# Patient Record
Sex: MALE | Race: WHITE | NOT HISPANIC OR LATINO | Employment: OTHER | URBAN - METROPOLITAN AREA
[De-identification: names, ages, dates, MRNs, and addresses within clinical notes are randomized per-mention and may not be internally consistent; named-entity substitution may affect disease eponyms.]

---

## 2023-09-01 LAB
ALANINE AMINOTRANSFERASE (SGPT) (U/L) IN SER/PLAS: 13 U/L (ref 10–52)
ALBUMIN (G/DL) IN SER/PLAS: 4.4 G/DL (ref 3.4–5)
ALKALINE PHOSPHATASE (U/L) IN SER/PLAS: 67 U/L (ref 33–136)
ANION GAP IN SER/PLAS: 9 MMOL/L (ref 10–20)
ASPARTATE AMINOTRANSFERASE (SGOT) (U/L) IN SER/PLAS: 18 U/L (ref 9–39)
BILIRUBIN TOTAL (MG/DL) IN SER/PLAS: 0.9 MG/DL (ref 0–1.2)
CALCIUM (MG/DL) IN SER/PLAS: 9.4 MG/DL (ref 8.6–10.3)
CARBON DIOXIDE, TOTAL (MMOL/L) IN SER/PLAS: 29 MMOL/L (ref 21–32)
CHLORIDE (MMOL/L) IN SER/PLAS: 101 MMOL/L (ref 98–107)
CHOLESTEROL (MG/DL) IN SER/PLAS: 117 MG/DL (ref 0–199)
CHOLESTEROL IN HDL (MG/DL) IN SER/PLAS: 48.1 MG/DL
CHOLESTEROL/HDL RATIO: 2.4
CREATININE (MG/DL) IN SER/PLAS: 0.97 MG/DL (ref 0.5–1.3)
ERYTHROCYTE DISTRIBUTION WIDTH (RATIO) BY AUTOMATED COUNT: 13 % (ref 11.5–14.5)
ERYTHROCYTE MEAN CORPUSCULAR HEMOGLOBIN CONCENTRATION (G/DL) BY AUTOMATED: 33.5 G/DL (ref 32–36)
ERYTHROCYTE MEAN CORPUSCULAR VOLUME (FL) BY AUTOMATED COUNT: 93 FL (ref 80–100)
ERYTHROCYTES (10*6/UL) IN BLOOD BY AUTOMATED COUNT: 4.64 X10E12/L (ref 4.5–5.9)
GFR MALE: 84 ML/MIN/1.73M2
GLUCOSE (MG/DL) IN SER/PLAS: 96 MG/DL (ref 74–99)
HEMATOCRIT (%) IN BLOOD BY AUTOMATED COUNT: 43.3 % (ref 41–52)
HEMOGLOBIN (G/DL) IN BLOOD: 14.5 G/DL (ref 13.5–17.5)
LDL: 55 MG/DL (ref 0–99)
LEUKOCYTES (10*3/UL) IN BLOOD BY AUTOMATED COUNT: 6.3 X10E9/L (ref 4.4–11.3)
PLATELETS (10*3/UL) IN BLOOD AUTOMATED COUNT: 214 X10E9/L (ref 150–450)
POTASSIUM (MMOL/L) IN SER/PLAS: 4.1 MMOL/L (ref 3.5–5.3)
PROSTATE SPECIFIC AG (NG/ML) IN SER/PLAS: 1.23 NG/ML (ref 0–4)
PROTEIN TOTAL: 6.6 G/DL (ref 6.4–8.2)
SODIUM (MMOL/L) IN SER/PLAS: 135 MMOL/L (ref 136–145)
TRIGLYCERIDE (MG/DL) IN SER/PLAS: 69 MG/DL (ref 0–149)
UREA NITROGEN (MG/DL) IN SER/PLAS: 14 MG/DL (ref 6–23)
VLDL: 14 MG/DL (ref 0–40)

## 2023-10-26 ENCOUNTER — TELEMEDICINE (OUTPATIENT)
Dept: PRIMARY CARE | Facility: CLINIC | Age: 68
End: 2023-10-26
Payer: MEDICARE

## 2023-10-26 DIAGNOSIS — J01.90 ACUTE SINUSITIS, RECURRENCE NOT SPECIFIED, UNSPECIFIED LOCATION: Primary | ICD-10-CM

## 2023-10-26 PROCEDURE — 99212 OFFICE O/P EST SF 10 MIN: CPT | Performed by: INTERNAL MEDICINE

## 2023-10-26 RX ORDER — POTASSIUM CHLORIDE 750 MG/1
10 TABLET, EXTENDED RELEASE ORAL DAILY
COMMUNITY
Start: 2021-08-17 | End: 2024-02-13 | Stop reason: SDUPTHER

## 2023-10-26 RX ORDER — LOSARTAN POTASSIUM 100 MG/1
100 TABLET ORAL DAILY
COMMUNITY
End: 2024-03-07 | Stop reason: SDUPTHER

## 2023-10-26 RX ORDER — SIMVASTATIN 20 MG/1
20 TABLET, FILM COATED ORAL NIGHTLY
COMMUNITY
Start: 2021-08-17 | End: 2024-03-07 | Stop reason: SDUPTHER

## 2023-10-26 RX ORDER — ASPIRIN 81 MG/1
1 TABLET ORAL DAILY
COMMUNITY

## 2023-10-26 RX ORDER — AMLODIPINE BESYLATE 5 MG/1
1 TABLET ORAL DAILY
COMMUNITY
Start: 2021-08-17 | End: 2024-03-07 | Stop reason: SDUPTHER

## 2023-10-26 RX ORDER — AMOXICILLIN AND CLAVULANATE POTASSIUM 875; 125 MG/1; MG/1
875 TABLET, FILM COATED ORAL 2 TIMES DAILY
Qty: 20 TABLET | Refills: 0 | Status: SHIPPED | OUTPATIENT
Start: 2023-10-26 | End: 2023-11-05

## 2023-10-26 RX ORDER — ATENOLOL 50 MG/1
1.5 TABLET ORAL DAILY
COMMUNITY
Start: 2021-08-17 | End: 2024-03-07 | Stop reason: SDUPTHER

## 2023-10-26 RX ORDER — SODIUM FLUORIDE/POTASSIUM NIT 1.1 %-5 %
PASTE (ML) DENTAL
COMMUNITY

## 2023-10-26 ASSESSMENT — PATIENT HEALTH QUESTIONNAIRE - PHQ9
2. FEELING DOWN, DEPRESSED OR HOPELESS: NOT AT ALL
SUM OF ALL RESPONSES TO PHQ9 QUESTIONS 1 AND 2: 0
1. LITTLE INTEREST OR PLEASURE IN DOING THINGS: NOT AT ALL

## 2023-10-26 NOTE — PROGRESS NOTES
Subjective   Patient ID: Rodolfo Wellington is a 68 y.o. male who presents for Cough (Sx's include coughing, wheezing, Yellow nasal discharge that started 1 week ago.  He did have a fever the 1st few days.).    HPI Pt is a pleasant 68 y.o. M who was evaluated during the phone call visit. PT states that he feels sick a little over a week and initially had fever which went away. However, pt states that the sinus discharge turned yellow and he has a lot of post nasal drip which triggers a lot of cough. During the clinical encounter pt denies fever, chills, no SOB, no chest pain/tightness, no abdominal pain, no N/V/D reported, no change of urination reported. Pt denies any other health concerns during this visit.  Sohx: reviewed  PMHx and Fhx: reviewed      Review of Systems  Constitutional: no fever, no chills  Eyes: no eyesight problems, no eye redness, no eye pain, no blurred vision  ENT: as mentioned at HPI  Cardiovascular: no chest pain or tightness, no SOB, the heart rate was not fast or slow  Respiratory:  no dyspnea with exertion or with rest, but as mentioned at HPI  Gastrointestinal: no abdominal pain, no constipation or diarrhea, no heartburn, no nausea or vomiting  Genitourinary: no urine frequency, no dysuria, no urinary urgency, no urinary incontinence or retention  Musculoskeletal: no back pain, no arthralgia, no joint swelling or stiffness, no muscle weakness  Integumentary: no skin lesions or rash  Neurological: no headaches, no dizziness or fainting, no limb weakness  Psychiatric: no mood changes, no anxiety or depression, no suicidal thoughts  Endocrine: no weight change, no temperature intolerance, no change of appetite  Hematologic/Lymphatic: no easy bruising or bleeding    Objective   There were no vitals taken for this visit.    Physical Exam  PE was not performed due to the phone setting of this visit, but pt was able to speak in full sentences, alert, oriented, not confused.  Vitals: were not  provided    Assessment/Plan   Acute sinusitis  Hx as mentioned above  Will start on 10 day treatment course with Amox/Clav acid 875/125 mg BID  Pt was instructed to contact PCP if pt will have no improvement of the condition/worsening of the sxs/new sxs on the current treatment  Plan was d/c with the pt in details, verbalized understanding, agreed  Please follow up with PCP as planned or sooner if needed

## 2023-11-22 ENCOUNTER — OFFICE VISIT (OUTPATIENT)
Dept: PRIMARY CARE | Facility: CLINIC | Age: 68
End: 2023-11-22
Payer: MEDICARE

## 2023-11-22 VITALS
HEIGHT: 71 IN | SYSTOLIC BLOOD PRESSURE: 130 MMHG | HEART RATE: 74 BPM | BODY MASS INDEX: 27.72 KG/M2 | WEIGHT: 198 LBS | TEMPERATURE: 98.2 F | DIASTOLIC BLOOD PRESSURE: 72 MMHG

## 2023-11-22 DIAGNOSIS — Z23 NEED FOR TDAP VACCINATION: ICD-10-CM

## 2023-11-22 DIAGNOSIS — L03.012 CELLULITIS OF LEFT INDEX FINGER: Primary | ICD-10-CM

## 2023-11-22 DIAGNOSIS — Z78.9 NON-SMOKER: ICD-10-CM

## 2023-11-22 DIAGNOSIS — S61.239A PUNCTURE WOUND OF FINGER, INITIAL ENCOUNTER: ICD-10-CM

## 2023-11-22 PROCEDURE — 90715 TDAP VACCINE 7 YRS/> IM: CPT | Performed by: FAMILY MEDICINE

## 2023-11-22 PROCEDURE — 1159F MED LIST DOCD IN RCRD: CPT | Performed by: FAMILY MEDICINE

## 2023-11-22 PROCEDURE — 3008F BODY MASS INDEX DOCD: CPT | Performed by: FAMILY MEDICINE

## 2023-11-22 PROCEDURE — 90471 IMMUNIZATION ADMIN: CPT | Performed by: FAMILY MEDICINE

## 2023-11-22 PROCEDURE — 99214 OFFICE O/P EST MOD 30 MIN: CPT | Performed by: FAMILY MEDICINE

## 2023-11-22 PROCEDURE — 1036F TOBACCO NON-USER: CPT | Performed by: FAMILY MEDICINE

## 2023-11-22 RX ORDER — CEPHALEXIN 500 MG/1
500 CAPSULE ORAL ONCE
Status: CANCELLED | OUTPATIENT
Start: 2023-11-22 | End: 2023-11-22

## 2023-11-22 RX ORDER — CEPHALEXIN 500 MG/1
500 CAPSULE ORAL 3 TIMES DAILY
Qty: 20 CAPSULE | Refills: 0 | Status: SHIPPED | OUTPATIENT
Start: 2023-11-22 | End: 2023-12-07 | Stop reason: WASHOUT

## 2023-11-22 ASSESSMENT — ENCOUNTER SYMPTOMS
HEMATOLOGIC/LYMPHATIC NEGATIVE: 1
CARDIOVASCULAR NEGATIVE: 1
ENDOCRINE NEGATIVE: 1
RESPIRATORY NEGATIVE: 1
ALLERGIC/IMMUNOLOGIC NEGATIVE: 1
EYES NEGATIVE: 1
CONSTITUTIONAL NEGATIVE: 1
GASTROINTESTINAL NEGATIVE: 1
MUSCULOSKELETAL NEGATIVE: 1
PSYCHIATRIC NEGATIVE: 1

## 2023-11-22 ASSESSMENT — PATIENT HEALTH QUESTIONNAIRE - PHQ9
1. LITTLE INTEREST OR PLEASURE IN DOING THINGS: NOT AT ALL
2. FEELING DOWN, DEPRESSED OR HOPELESS: NOT AT ALL
SUM OF ALL RESPONSES TO PHQ9 QUESTIONS 1 AND 2: 0

## 2023-11-22 NOTE — PROGRESS NOTES
Valente Reynolds is here for evaluation of a possible foreign object in his left index finger.  About 5 or 6 days ago he was working with some rosebushes in his yard.  He did not feel any pricking sensation but later noticed a hole in his skin which subsequently became red and raised.  Since then he has been squeezing the lesion and trying to express any fluid out of it.  He had gotten some small amounts of pustular drainage several days ago.  He continues to irritate the lesion by squeezing it.  He has had no fevers or chills.  He is not sure if any remnant of a thorn might be remaining in his finger.  He would like to have this area explored to make sure there is no further foreign object in the wound.  He does not remember the date of his last tetanus immunization    Patient ID: Rodolfo Wellington is a 68 y.o. male who presents for Follow-up (Left index finger infection x6 days; thinks maybe a thorn):    Problem List Items Addressed This Visit    None  Visit Diagnoses       Non-smoker        BMI 27.0-27.9,adult        Need for Tdap vaccination               Past Medical History:   Diagnosis Date    Body mass index (BMI) 26.0-26.9, adult     BMI 26.0-26.9,adult    COVID-19 09/02/2022    COVID-19    Other specified counseling 08/16/2022    Counseled about COVID-19 virus infection    Overweight     Overweight      Past Surgical History:   Procedure Laterality Date    OTHER SURGICAL HISTORY  01/26/2022    Throat surgery    OTHER SURGICAL HISTORY  01/26/2022    Finger surgical procedure    OTHER SURGICAL HISTORY  02/15/2022    Colonoscopy      No family history on file.   Social History     Socioeconomic History    Marital status:      Spouse name: Not on file    Number of children: Not on file    Years of education: Not on file    Highest education level: Not on file   Occupational History    Not on file   Tobacco Use    Smoking status: Never    Smokeless tobacco: Never   Substance and Sexual Activity    Alcohol use:  Yes     Alcohol/week: 4.0 standard drinks of alcohol     Types: 4 Standard drinks or equivalent per week    Drug use: Not Currently    Sexual activity: Not on file   Other Topics Concern    Not on file   Social History Narrative    Not on file     Social Determinants of Health     Financial Resource Strain: Not on file   Food Insecurity: Not on file   Transportation Needs: Not on file   Physical Activity: Not on file   Stress: Not on file   Social Connections: Not on file   Intimate Partner Violence: Not on file   Housing Stability: Not on file      Oxycodone-acetaminophen   Current Outpatient Medications   Medication Sig Dispense Refill    amLODIPine (Norvasc) 5 mg tablet Take 1 tablet (5 mg) by mouth once daily.      aspirin 81 mg EC tablet Take 1 tablet (81 mg) by mouth once daily.      atenolol (Tenormin) 50 mg tablet Take 1.5 tablets (75 mg) by mouth once daily.      Klor-Con M10 10 mEq ER tablet Take 1 tablet (10 mEq) by mouth once daily.      losartan (Cozaar) 100 mg tablet Take 1 tablet (100 mg) by mouth once daily.      PreviDent 5000 Sensitive 1.1-5 % paste USE ONCE DAILY AT BEDTIME      simvastatin (Zocor) 20 mg tablet Take 1 tablet (20 mg) by mouth once daily at bedtime.       No current facility-administered medications for this visit.       Immunization History   Administered Date(s) Administered    Flu vaccine (IIV4), preservative free *Check age/dose* 11/01/2018    Flu vaccine, quadrivalent, high-dose, preservative free, age 65y+ (FLUZONE) 09/25/2020    Influenza, High Dose Seasonal, Preservative Free 09/29/2018    Influenza, Seasonal, Quadrivalent, Adjuvanted 10/29/2021, 10/05/2022, 10/02/2023    Influenza, Unspecified 11/01/2012, 11/01/2014, 11/01/2015, 12/05/2016, 10/01/2021    Influenza, injectable, MDCK, preservative free, quadrivalent 12/04/2017    Influenza, seasonal, injectable 10/01/2020    Influenza, seasonal, injectable, preservative free 11/03/2015    Novel influenza-H1N1-09,  preservative-free 01/04/2010    Pfizer COVID-19 vaccine, Fall 2023, 12 years and older, (30mcg/0.3mL) 11/16/2023    Pfizer COVID-19 vaccine, bivalent, age 12 years and older (30 mcg/0.3 mL) 10/20/2022    Pfizer Gray Cap SARS-CoV-2 04/21/2022    Pfizer Purple Cap SARS-CoV-2 03/11/2021, 04/08/2021, 10/21/2021, 04/22/2022    Pneumococcal conjugate vaccine, 13-valent (PREVNAR 13) 07/03/2018    Pneumococcal polysaccharide vaccine, 23-valent, age 2 years and older (PNEUMOVAX 23) 07/12/2019    Zoster vaccine, recombinant, adult (SHINGRIX) 08/29/2021, 12/03/2021    Zoster, live 02/05/2016        Review of Systems   Constitutional: Negative.    HENT: Negative.     Eyes: Negative.    Respiratory: Negative.     Cardiovascular: Negative.    Gastrointestinal: Negative.    Endocrine: Negative.    Genitourinary: Negative.    Musculoskeletal: Negative.    Skin: Negative.    Allergic/Immunologic: Negative.    Hematological: Negative.    Psychiatric/Behavioral: Negative.     All other systems reviewed and are negative.       Vitals:    11/22/23 1109   BP: 130/72   Pulse: 74   Temp: 36.8 °C (98.2 °F)     Vitals:    11/22/23 1109   Weight: 89.8 kg (198 lb)      Physical Exam  Constitutional:       General: He is not in acute distress.     Appearance: Normal appearance.   Neurological:      Mental Status: He is alert.     Left index finger--there is a reddened raised area of skin on the tip of the finger dorso medially.  There appears to be a small dark area in the middle of the puncture wound.  With the needle I was able to remove what appeared to be a thorn like structure.  There is some serous drainage able to be expressed from the lesion.  I did not do any deep probing of the wound itself.  There is no pustular drainage or bleeding.    ASSESSMENT/PLAN: Puncture wound left index finger with mild cellulitis and questionable foreign object    Plan--begin Keflex 500 mg 3 times daily for 1 week.  Give Tdap immunization update  Advised  patient not to squeeze the lesion anymore that he did vigorously several times while in the office.  Recommended that if any redness or swelling persist that he needs to consult a hand surgeon Dr. Cordon for further surgical evaluation.  Call for any increased redness pain or swelling  Follow-up as scheduled and call as needed

## 2023-12-07 ENCOUNTER — TELEMEDICINE (OUTPATIENT)
Dept: PRIMARY CARE | Facility: CLINIC | Age: 68
End: 2023-12-07
Payer: MEDICARE

## 2023-12-07 DIAGNOSIS — J01.90 ACUTE SINUSITIS, RECURRENCE NOT SPECIFIED, UNSPECIFIED LOCATION: Primary | ICD-10-CM

## 2023-12-07 PROCEDURE — 99441 PR PHYS/QHP TELEPHONE EVALUATION 5-10 MIN: CPT | Performed by: INTERNAL MEDICINE

## 2023-12-07 RX ORDER — AZITHROMYCIN 250 MG/1
TABLET, FILM COATED ORAL
Qty: 6 TABLET | Refills: 0 | Status: SHIPPED | OUTPATIENT
Start: 2023-12-07 | End: 2023-12-12

## 2023-12-07 NOTE — PROGRESS NOTES
Subjective   Patient ID: Rodolfo Wellington is a 68 y.o. male who presents for Nasal Congestion (Sick visit, congestion).    HPI Pt is a pleasant 68 y.o. M who was evaluated during the phone call visit with the hx of congested nose, colored nasal discharge, post nasal drip which triggers the cough. Pt states that he has the sxs for more than a week. Pt also states that he had a sore throat, but it went away. During the clinical encounter pt denies fever, chills, no SOB, no chest pain/tightness, no abdominal pain, no N/V/D reported, no change of urination reported. Pt denies any other health concerns during this visit.  Sohx: reviewed  PMHx and Fhx: reviewed    Review of Systems  Constitutional: no fever, no chills  Eyes: no eyesight problems, no eye redness, no eye pain, no blurred vision  ENT: no ear pain, no hearing loss, but as mentioned at HPI  Cardiovascular: no chest pain or tightness, no SOB, the heart rate was not fast or slow  Respiratory: no dyspnea with exertion or with rest, no wheezing, but as mentioned at HPI  Gastrointestinal: no abdominal pain, no constipation or diarrhea, no heartburn, no nausea or vomiting  Genitourinary: no urine frequency, no dysuria, no urinary urgency, no urinary incontinence or retention  Musculoskeletal: no back pain, no arthralgia, no joint swelling or stiffness, no muscle weakness  Integumentary: no skin lesions or rash  Neurological: no headaches, no dizziness or fainting, no limb weakness  Psychiatric: no mood changes, no anxiety or depression, no suicidal thoughts  Endocrine: no weight change, no temperature intolerance, no change of appetite  Hematologic/Lymphatic: no easy bruising or bleeding  Objective   There were no vitals taken for this visit.    Physical Exam  PE was not performed due to the phone setting of this visit, but pt was able to speak in full sentences, alert, oriented, not confused.  Vitals: were not provided    Assessment/Plan   Acute sinusitis;  Hx as  mentioned above  Will start on 5 day TX with Azithromycin 250 mg PO daily  Pt was instructed to use tylenol for pain management  Pt was instructed to contact PCP if pt will have no improvement of the condition/worsening of the sxs/new sxs on the current treatment  Plan was d/c with the pt in details, verbalized understanding, agreed  Please follow up with PCP as planned or sooner if needed

## 2023-12-14 ENCOUNTER — HOSPITAL ENCOUNTER (OUTPATIENT)
Facility: HOSPITAL | Age: 68
Setting detail: OUTPATIENT SURGERY
End: 2023-12-14
Payer: MEDICARE

## 2023-12-14 DIAGNOSIS — M71.342 OTHER BURSAL CYST, LEFT HAND: Primary | ICD-10-CM

## 2024-02-08 DIAGNOSIS — I10 ESSENTIAL (PRIMARY) HYPERTENSION: ICD-10-CM

## 2024-02-09 NOTE — TELEPHONE ENCOUNTER
Patient says he will run out of this medication on Tues 2/13 and that he would like a 30 day supply if possible instead of 90. He is scheduled to see Dr. Gibbs on 3/7/24 and wants to get his 90 day refill ordered during that appt. Please advise

## 2024-02-12 NOTE — TELEPHONE ENCOUNTER
Pt called again wondering about klor-con will need it soon. Pt will be out by 2/13

## 2024-02-13 DIAGNOSIS — I10 ESSENTIAL HYPERTENSION: Primary | ICD-10-CM

## 2024-02-13 RX ORDER — POTASSIUM CHLORIDE 750 MG/1
10 TABLET, EXTENDED RELEASE ORAL DAILY
Qty: 90 TABLET | Refills: 1 | Status: SHIPPED | OUTPATIENT
Start: 2024-02-13 | End: 2024-03-07 | Stop reason: SDUPTHER

## 2024-02-15 RX ORDER — POTASSIUM CHLORIDE 750 MG/1
10 TABLET, EXTENDED RELEASE ORAL DAILY
Qty: 90 TABLET | Refills: 0 | OUTPATIENT
Start: 2024-02-15

## 2024-03-07 ENCOUNTER — OFFICE VISIT (OUTPATIENT)
Dept: PRIMARY CARE | Facility: CLINIC | Age: 69
End: 2024-03-07
Payer: MEDICARE

## 2024-03-07 ENCOUNTER — TELEPHONE (OUTPATIENT)
Dept: PRIMARY CARE | Facility: CLINIC | Age: 69
End: 2024-03-07

## 2024-03-07 VITALS
BODY MASS INDEX: 27.58 KG/M2 | HEART RATE: 88 BPM | DIASTOLIC BLOOD PRESSURE: 54 MMHG | HEIGHT: 71 IN | SYSTOLIC BLOOD PRESSURE: 154 MMHG | WEIGHT: 197 LBS | TEMPERATURE: 97.3 F

## 2024-03-07 DIAGNOSIS — I10 PRIMARY HYPERTENSION: ICD-10-CM

## 2024-03-07 DIAGNOSIS — Z78.9 NEVER SMOKED CIGARETTES: ICD-10-CM

## 2024-03-07 DIAGNOSIS — Z12.5 PROSTATE CANCER SCREENING: ICD-10-CM

## 2024-03-07 DIAGNOSIS — E66.3 OVERWEIGHT WITH BODY MASS INDEX (BMI) OF 27 TO 27.9 IN ADULT: ICD-10-CM

## 2024-03-07 DIAGNOSIS — E78.5 HYPERLIPIDEMIA, UNSPECIFIED HYPERLIPIDEMIA TYPE: ICD-10-CM

## 2024-03-07 DIAGNOSIS — E05.90 HYPERTHYROIDISM: ICD-10-CM

## 2024-03-07 PROBLEM — I51.7 LVH (LEFT VENTRICULAR HYPERTROPHY): Status: ACTIVE | Noted: 2024-03-07

## 2024-03-07 PROBLEM — I77.810 DILATED AORTIC ROOT (CMS-HCC): Status: ACTIVE | Noted: 2024-03-07

## 2024-03-07 PROBLEM — N28.9 RENAL INSUFFICIENCY: Status: ACTIVE | Noted: 2024-03-07

## 2024-03-07 PROCEDURE — 1160F RVW MEDS BY RX/DR IN RCRD: CPT | Performed by: FAMILY MEDICINE

## 2024-03-07 PROCEDURE — 3078F DIAST BP <80 MM HG: CPT | Performed by: FAMILY MEDICINE

## 2024-03-07 PROCEDURE — 3008F BODY MASS INDEX DOCD: CPT | Performed by: FAMILY MEDICINE

## 2024-03-07 PROCEDURE — 1159F MED LIST DOCD IN RCRD: CPT | Performed by: FAMILY MEDICINE

## 2024-03-07 PROCEDURE — 99213 OFFICE O/P EST LOW 20 MIN: CPT | Performed by: FAMILY MEDICINE

## 2024-03-07 PROCEDURE — 1036F TOBACCO NON-USER: CPT | Performed by: FAMILY MEDICINE

## 2024-03-07 PROCEDURE — 3077F SYST BP >= 140 MM HG: CPT | Performed by: FAMILY MEDICINE

## 2024-03-07 RX ORDER — AMLODIPINE BESYLATE 5 MG/1
5 TABLET ORAL 2 TIMES DAILY
Qty: 180 TABLET | Refills: 1 | Status: SHIPPED | OUTPATIENT
Start: 2024-03-07

## 2024-03-07 RX ORDER — LOSARTAN POTASSIUM 100 MG/1
100 TABLET ORAL DAILY
Qty: 90 TABLET | Refills: 1 | Status: SHIPPED | OUTPATIENT
Start: 2024-03-07

## 2024-03-07 RX ORDER — POTASSIUM CHLORIDE 750 MG/1
10 TABLET, EXTENDED RELEASE ORAL DAILY
Qty: 90 TABLET | Refills: 1 | Status: SHIPPED | OUTPATIENT
Start: 2024-03-07

## 2024-03-07 RX ORDER — ATENOLOL 50 MG/1
75 TABLET ORAL DAILY
Qty: 135 TABLET | Refills: 1 | Status: SHIPPED | OUTPATIENT
Start: 2024-03-07

## 2024-03-07 RX ORDER — SIMVASTATIN 20 MG/1
20 TABLET, FILM COATED ORAL NIGHTLY
Qty: 90 TABLET | Refills: 1 | Status: SHIPPED | OUTPATIENT
Start: 2024-03-07

## 2024-03-07 ASSESSMENT — PATIENT HEALTH QUESTIONNAIRE - PHQ9
SUM OF ALL RESPONSES TO PHQ9 QUESTIONS 1 AND 2: 0
2. FEELING DOWN, DEPRESSED OR HOPELESS: NOT AT ALL
1. LITTLE INTEREST OR PLEASURE IN DOING THINGS: NOT AT ALL

## 2024-03-07 NOTE — PROGRESS NOTES
Valente Reynolds is here for follow-up on hypertension and hyperlipidemia.  He states that he is overall been feeling well.  He continues on his meds noted.  He has no new complaints.  His home blood pressures have been higher than usual especially in the morning with systolics in the 150s on a consistent basis.  Afternoon and evening blood pressures are stabilized and improved.  He has not been exercising as much as previous.  He drinks alcohol only socially and denies excessive caffeine intake.    Patient ID: Rodolfo Wellington is a 68 y.o. male who presents for Follow-up and Med Refill:    Problem List Items Addressed This Visit    None     Past Medical History:   Diagnosis Date    Body mass index (BMI) 26.0-26.9, adult     BMI 26.0-26.9,adult    COVID-19 09/02/2022    COVID-19    Other specified counseling 08/16/2022    Counseled about COVID-19 virus infection    Overweight     Overweight      Past Surgical History:   Procedure Laterality Date    OTHER SURGICAL HISTORY  01/26/2022    Throat surgery    OTHER SURGICAL HISTORY  01/26/2022    Finger surgical procedure    OTHER SURGICAL HISTORY  02/15/2022    Colonoscopy      Family History   Problem Relation Name Age of Onset    Heart disease Mother      Dementia Father      Cancer Brother        Social History     Socioeconomic History    Marital status:      Spouse name: Not on file    Number of children: Not on file    Years of education: Not on file    Highest education level: Not on file   Occupational History    Not on file   Tobacco Use    Smoking status: Never    Smokeless tobacco: Never   Substance and Sexual Activity    Alcohol use: Yes     Alcohol/week: 4.0 standard drinks of alcohol     Types: 4 Standard drinks or equivalent per week    Drug use: Not Currently    Sexual activity: Not on file   Other Topics Concern    Not on file   Social History Narrative    Not on file     Social Determinants of Health     Financial Resource Strain: Not on file   Food  Insecurity: Not on file   Transportation Needs: Not on file   Physical Activity: Not on file   Stress: Not on file   Social Connections: Not on file   Intimate Partner Violence: Not on file   Housing Stability: Not on file      Oxycodone-acetaminophen   Current Outpatient Medications   Medication Sig Dispense Refill    amLODIPine (Norvasc) 5 mg tablet Take 1 tablet (5 mg) by mouth once daily.      aspirin 81 mg EC tablet Take 1 tablet (81 mg) by mouth once daily.      atenolol (Tenormin) 50 mg tablet Take 1.5 tablets (75 mg) by mouth once daily.      Klor-Con M10 10 mEq ER tablet Take 1 tablet (10 mEq) by mouth once daily. 90 tablet 1    losartan (Cozaar) 100 mg tablet Take 1 tablet (100 mg) by mouth once daily.      PreviDent 5000 Sensitive 1.1-5 % paste USE ONCE DAILY AT BEDTIME      simvastatin (Zocor) 20 mg tablet Take 1 tablet (20 mg) by mouth once daily at bedtime.       No current facility-administered medications for this visit.       Immunization History   Administered Date(s) Administered    Flu vaccine (IIV4), preservative free *Check age/dose* 11/01/2018    Flu vaccine, quadrivalent, high-dose, preservative free, age 65y+ (FLUZONE) 09/25/2020    Flu vaccine, quadrivalent, no egg protein, age 6 month or greater (FLUCELVAX) 12/04/2017    Influenza, High Dose Seasonal, Preservative Free 09/29/2018    Influenza, Seasonal, Quadrivalent, Adjuvanted 10/29/2021, 10/05/2022, 10/02/2023    Influenza, Unspecified 11/01/2012, 11/01/2014, 11/01/2015, 12/05/2016, 10/01/2021    Influenza, seasonal, injectable 10/01/2020    Influenza, seasonal, injectable, preservative free 11/03/2015    Novel influenza-H1N1-09, preservative-free 01/04/2010    Pfizer COVID-19 vaccine, Fall 2023, 12 years and older, (30mcg/0.3mL) 11/16/2023    Pfizer COVID-19 vaccine, bivalent, age 12 years and older (30 mcg/0.3 mL) 10/20/2022    Pfizer Gray Cap SARS-CoV-2 04/21/2022    Pfizer Purple Cap SARS-CoV-2 03/11/2021, 04/08/2021, 10/21/2021,  04/22/2022    Pneumococcal conjugate vaccine, 13-valent (PREVNAR 13) 07/03/2018    Pneumococcal polysaccharide vaccine, 23-valent, age 2 years and older (PNEUMOVAX 23) 07/12/2019    Tdap vaccine, age 7 year and older (BOOSTRIX, ADACEL) 11/22/2023    Zoster vaccine, recombinant, adult (SHINGRIX) 08/29/2021, 12/03/2021    Zoster, live 02/05/2016        Review of Systems   Constitutional: Negative.    HENT: Negative.     Eyes: Negative.    Respiratory: Negative.     Cardiovascular: Negative.    Gastrointestinal: Negative.    Endocrine: Negative.    Genitourinary: Negative.    Musculoskeletal: Negative.    Skin: Negative.    Allergic/Immunologic: Negative.    Hematological: Negative.    Psychiatric/Behavioral: Negative.     All other systems reviewed and are negative.       Vitals:    03/07/24 1043   BP: 154/54   Pulse: 88   Temp: 36.3 °C (97.3 °F)     Vitals:    03/07/24 1043   Weight: 89.4 kg (197 lb)      Physical Exam  Constitutional:       General: He is not in acute distress.     Appearance: Normal appearance.   Neck:      Vascular: No carotid bruit.   Cardiovascular:      Rate and Rhythm: Normal rate and regular rhythm.      Pulses: Normal pulses.      Heart sounds: Normal heart sounds. No murmur heard.     No friction rub. No gallop.   Pulmonary:      Effort: Pulmonary effort is normal. No respiratory distress.      Breath sounds: Normal breath sounds. No wheezing or rales.   Musculoskeletal:      Cervical back: Neck supple.   Neurological:      Mental Status: He is alert.          ASSESSMENT/PLAN: Hypertension with worsening control.  We discussed various options for optimal blood pressure control.  In the past he had been on amlodipine 10 mg which did cause some slight pedal edema.  We will try amlodipine 5 mg twice daily and add the extra dose in the evening.  Continue losartan and atenolol in the morning as before.  Exercise regularly.  Continue to monitor home blood pressures.    Hyperlipidemia.  Continue  simvastatin daily.    Provided a list of home blood pressure checks in about 2 to 3 weeks.  If blood pressures at home have improved we will continue the above-mentioned regimen.  Follow-up in 1 months if home blood pressures continue to be elevated.    Follow-up in 6 months otherwise and call as needed       Scribe Attestation  By signing my name below, I, Bee Harding LPN, Scribe   attest that this documentation has been prepared under the direction and in the presence of Yfn Gibbs MD.

## 2024-03-07 NOTE — TELEPHONE ENCOUNTER
Medications were ordered during office visit and are awaiting provider authorization.  [employer] message sent regarding same.

## 2024-03-07 NOTE — TELEPHONE ENCOUNTER
Rx Refill Request Telephone Encounter    Name:  Rodolfo Wellington  :  [id number]  Medication Name:  atenolol (Tenormin) 50 mg tablet   Medication Name:  simvastatin (Zocor) 20 mg tablet   Medication Name:  amLODIPine (Norvasc) 5 mg tablet twice daily-pt stated it just changed to 2 pills a day at [facility] today    Specific Pharmacy location:  [facility] #[id number] [address] AT [address], [place] [id number]  Phone: [phone]  Fax: [phone]     Date of last appointment: 2024  Date of next appointment:  2024  Best number to reach patient:  [phone]

## 2024-03-08 ASSESSMENT — ENCOUNTER SYMPTOMS
CONSTITUTIONAL NEGATIVE: 1
EYES NEGATIVE: 1
CARDIOVASCULAR NEGATIVE: 1
ALLERGIC/IMMUNOLOGIC NEGATIVE: 1
MUSCULOSKELETAL NEGATIVE: 1
RESPIRATORY NEGATIVE: 1
PSYCHIATRIC NEGATIVE: 1
GASTROINTESTINAL NEGATIVE: 1
ENDOCRINE NEGATIVE: 1
HEMATOLOGIC/LYMPHATIC NEGATIVE: 1

## 2024-05-31 ENCOUNTER — TELEMEDICINE (OUTPATIENT)
Dept: PRIMARY CARE | Facility: CLINIC | Age: 69
End: 2024-05-31
Payer: MEDICARE

## 2024-05-31 DIAGNOSIS — U07.1 COVID-19 VIRUS INFECTION: Primary | ICD-10-CM

## 2024-05-31 PROBLEM — J01.90 ACUTE SINUSITIS: Status: ACTIVE | Noted: 2024-05-31

## 2024-05-31 PROBLEM — L03.019 CELLULITIS OF FINGER: Status: ACTIVE | Noted: 2024-05-31

## 2024-05-31 PROBLEM — S61.239A PUNCTURE WOUND OF FINGER: Status: ACTIVE | Noted: 2024-05-31

## 2024-05-31 PROBLEM — L72.3 SEBACEOUS CYST: Status: ACTIVE | Noted: 2024-05-31

## 2024-05-31 PROBLEM — I25.85 CHRONIC CORONARY MICROVASCULAR DYSFUNCTION: Status: ACTIVE | Noted: 2024-02-29

## 2024-05-31 PROBLEM — K11.5 SIALOLITHIASIS, DUCTAL: Status: ACTIVE | Noted: 2024-05-31

## 2024-05-31 PROBLEM — M77.12 LATERAL EPICONDYLITIS OF LEFT ELBOW: Status: ACTIVE | Noted: 2024-05-31

## 2024-05-31 PROBLEM — E87.6 HYPOKALEMIA: Status: ACTIVE | Noted: 2019-03-21

## 2024-05-31 PROBLEM — C43.59 MALIGNANT MELANOMA OF BACK (MULTI): Status: ACTIVE | Noted: 2024-05-31

## 2024-05-31 PROCEDURE — 3008F BODY MASS INDEX DOCD: CPT | Performed by: INTERNAL MEDICINE

## 2024-05-31 PROCEDURE — 1036F TOBACCO NON-USER: CPT | Performed by: INTERNAL MEDICINE

## 2024-05-31 PROCEDURE — 1159F MED LIST DOCD IN RCRD: CPT | Performed by: INTERNAL MEDICINE

## 2024-05-31 PROCEDURE — 1160F RVW MEDS BY RX/DR IN RCRD: CPT | Performed by: INTERNAL MEDICINE

## 2024-05-31 PROCEDURE — 99441 PR PHYS/QHP TELEPHONE EVALUATION 5-10 MIN: CPT | Performed by: INTERNAL MEDICINE

## 2024-05-31 RX ORDER — NIRMATRELVIR AND RITONAVIR 300-100 MG
3 KIT ORAL 2 TIMES DAILY
Qty: 30 TABLET | Refills: 0 | Status: SHIPPED | OUTPATIENT
Start: 2024-05-31 | End: 2024-06-05

## 2024-05-31 NOTE — PROGRESS NOTES
Assessment/Plan   Problem List Items Addressed This Visit       COVID-19 virus infection - Primary   This was a viral syndrome symptomatic with positive COVID testing which was done yesterday  He is on simvastatin  Paxlovid is being prescribed but advised to hold simvastatin during these.  Anticipate quick resolution and improvement  Follow-up as needed  Note that renal function is normal as well total time on the telephone was 7 minutes    Subjective   Patient ID: Rodolfo Wellington is a 68 y.o. male who presents for Covid positive (Symptoms started on 5/29/2024.  Symptoms of fatigue, fever of 100 and some cough nose drainage.  ).    Past Surgical History:   Procedure Laterality Date    OTHER SURGICAL HISTORY  01/26/2022    Throat surgery    OTHER SURGICAL HISTORY  01/26/2022    Finger surgical procedure    OTHER SURGICAL HISTORY  02/15/2022    Colonoscopy      Family History   Problem Relation Name Age of Onset    Heart disease Mother      Dementia Father      Cancer Brother        Social History     Socioeconomic History    Marital status:      Spouse name: Not on file    Number of children: Not on file    Years of education: Not on file    Highest education level: Not on file   Occupational History    Not on file   Tobacco Use    Smoking status: Never    Smokeless tobacco: Never   Substance and Sexual Activity    Alcohol use: Yes     Alcohol/week: 4.0 standard drinks of alcohol     Types: 4 Standard drinks or equivalent per week    Drug use: Not Currently    Sexual activity: Not on file   Other Topics Concern    Not on file   Social History Narrative    Not on file     Social Determinants of Health     Financial Resource Strain: Not on file   Food Insecurity: Not on file   Transportation Needs: Not on file   Physical Activity: Not on file   Stress: Not on file   Social Connections: Not on file   Intimate Partner Violence: Not on file   Housing Stability: Not on file      Oxycodone-acetaminophen   Current  "Outpatient Medications   Medication Sig Dispense Refill    amLODIPine (Norvasc) 5 mg tablet Take 1 tablet (5 mg) by mouth 2 times a day. 180 tablet 1    aspirin 81 mg EC tablet Take 1 tablet (81 mg) by mouth once daily.      atenolol (Tenormin) 50 mg tablet Take 1.5 tablets (75 mg) by mouth once daily. 135 tablet 1    Klor-Con M10 10 mEq ER tablet Take 1 tablet (10 mEq) by mouth once daily. 90 tablet 1    losartan (Cozaar) 100 mg tablet Take 1 tablet (100 mg) by mouth once daily. 90 tablet 1    PreviDent 5000 Sensitive 1.1-5 % paste USE ONCE DAILY AT BEDTIME      simvastatin (Zocor) 20 mg tablet Take 1 tablet (20 mg) by mouth once daily at bedtime. 90 tablet 1    nirmatrelvir-ritonavir (Paxlovid) 300 mg (150 mg x 2)-100 mg tablet therapy pack Take 3 tablets by mouth 2 times a day for 5 days. Follow the instructions on the package 30 tablet 0     No current facility-administered medications for this visit.      There were no vitals filed for this visit.   Problem List Items Addressed This Visit       COVID-19 virus infection - Primary      No orders of the defined types were placed in this encounter.       HPI\  This is a 68-year-old gentleman who had a history that he had a COVID before but did not get any Paxlovid at the time because he passed the time  He also been vaccinated  But he developed sore throat on this Wednesday and then his symptoms got worse and he got fatigue and then things are not getting better he made this telephone call visit and was wondering whether his suitable candidate for Paxil of it  He has some fever more than 100 °F and feel exhaustion and some sore throat and voice is affected    ROS    PHYSICAL EXAM  Physical examination is not possible telephonic conversation suggest that he was not in respiratory distress  Discussed the management on the telephone    No results found for: \"PR1\", \"BMPR1A\", \"CMPLAS\", \"QZ4HHQLI\", \"KPSAT\"   Lab Results   Component Value Date    CHOL 117 09/01/2023    " CHHDL 2.4 09/01/2023

## 2024-08-28 ENCOUNTER — HOSPITAL ENCOUNTER (OUTPATIENT)
Dept: RADIOLOGY | Facility: EXTERNAL LOCATION | Age: 69
Discharge: HOME | End: 2024-08-28

## 2024-08-28 DIAGNOSIS — M54.50 LUMBAR PAIN: ICD-10-CM

## 2024-08-29 ENCOUNTER — OFFICE VISIT (OUTPATIENT)
Dept: ORTHOPEDIC SURGERY | Facility: CLINIC | Age: 69
End: 2024-08-29
Payer: MEDICARE

## 2024-08-29 VITALS — WEIGHT: 190 LBS | BODY MASS INDEX: 26.6 KG/M2 | HEIGHT: 71 IN

## 2024-08-29 DIAGNOSIS — S32.010D CLOSED WEDGE COMPRESSION FRACTURE OF L1 VERTEBRA WITH ROUTINE HEALING: ICD-10-CM

## 2024-08-29 DIAGNOSIS — M40.15 OTHER SECONDARY KYPHOSIS, THORACOLUMBAR REGION: Primary | ICD-10-CM

## 2024-08-29 PROCEDURE — 1159F MED LIST DOCD IN RCRD: CPT | Performed by: PHYSICIAN ASSISTANT

## 2024-08-29 PROCEDURE — 3008F BODY MASS INDEX DOCD: CPT | Performed by: PHYSICIAN ASSISTANT

## 2024-08-29 PROCEDURE — 99203 OFFICE O/P NEW LOW 30 MIN: CPT | Performed by: PHYSICIAN ASSISTANT

## 2024-08-29 ASSESSMENT — PAIN - FUNCTIONAL ASSESSMENT: PAIN_FUNCTIONAL_ASSESSMENT: 0-10

## 2024-08-30 NOTE — PROGRESS NOTES
HPI:  Rodolfo Wellington is a 69 y.o. male who presents to the spine clinic for evaluation of L1 compression fracture.     Reports he fell off an 8 ft ladder 2 days ago, landing flat on his back. Felt immediate mid/low back pain with decreased ROM and stiffness prompting urgent care evaluation. Xrays demonstrated L1 wedge compression fracture with focal Kyphosis and he was referred here for further management.     Today, reports back discomfort but feels much better compared to the last couple days. Denies lower extremity pain/numbness/tingling. No leg weakness. Reports feeling constipated but denies bowel/bladder incontinence.     Has been alternating ibuprofen/tylenol prn.     ROS:  Reviewed on EMR and patient intake sheet.    PMH/SH:  Reviewed on EMR and patient intake sheet.    Exam:  Physical Exam  Spine Musculoskeletal Exam    Well appearing, NAD  Pleasant & cooperative  BMI 26.50  Decreased ROM lumbar spine with rotation, flexion/extension  no paraspinal muscle spasms  + moderate TTP thoracolumbar junction  lower extremity sensation intact to light touch  lower extremity motor 5/5 throughout  normal gait & station; ambulates independently     Radiology:     Xrays lumbar spine dated 08/27/24 personally reviewed along with the accompanying rad report.   L1 wedge compression fracture with over 50% loss of body height. Focal kyphosis at T12-L1      Assessment and Plan:  1. Closed wedge compression fracture of L1 vertebra with routine healing  2. Other secondary kyphosis, thoracolumbar region    I reviewed the X-rays in detail with Rodolfo Wellington today. We discussed that compression fractures typically heal very well on their own without intervention over 6-8 weeks. Recommend no lifting more than 15 pounds, limiting bending/lifting/twisting at the waist, and no activities that strain low back until follow up. Encourage ambulation as tolerated.    Recommend pain control with Tylenol, lidocaine patches, and muscle relaxers  PRN.     Plan to follow up in 6 weeks with repeat X-rays. If no better at that point can consider MRI scan of the lumbar spine.     We did discuss the focal kyphosis today and the potential for surgical intervention to stabilize this area with a lumbar fusion.   At this time he is fairly asymptomatic and would like to avoid surgical intervention if possible. Discussed symptoms of progression today including lower extremity pain/numbness/tingling, leg weakness, bowel or bladder dysfunction in which case recommend sooner follow up.    Patient in agreement to plan of care. Of course Rodolfo Wellington is welcome to call at anytime with questions or concerns.     Liss Watters PA-C  Department of Orthopaedic Surgery    [address] [place] [address]    Voicemail: [phone]   Appts: [phone]  Fax: [phone]

## 2024-09-10 ENCOUNTER — TELEPHONE (OUTPATIENT)
Dept: PRIMARY CARE | Facility: CLINIC | Age: 69
End: 2024-09-10

## 2024-09-10 ENCOUNTER — LAB (OUTPATIENT)
Dept: LAB | Facility: LAB | Age: 69
End: 2024-09-10
Payer: MEDICARE

## 2024-09-10 DIAGNOSIS — Z12.5 PROSTATE CANCER SCREENING: ICD-10-CM

## 2024-09-10 DIAGNOSIS — E78.5 HYPERLIPIDEMIA, UNSPECIFIED HYPERLIPIDEMIA TYPE: ICD-10-CM

## 2024-09-10 DIAGNOSIS — E05.90 HYPERTHYROIDISM: ICD-10-CM

## 2024-09-10 DIAGNOSIS — I10 PRIMARY HYPERTENSION: ICD-10-CM

## 2024-09-10 DIAGNOSIS — E66.3 OVERWEIGHT WITH BODY MASS INDEX (BMI) OF 27 TO 27.9 IN ADULT: ICD-10-CM

## 2024-09-10 LAB
ALBUMIN SERPL BCP-MCNC: 4.5 G/DL (ref 3.4–5)
ALP SERPL-CCNC: 103 U/L (ref 33–136)
ALT SERPL W P-5'-P-CCNC: 12 U/L (ref 10–52)
ANION GAP SERPL CALC-SCNC: 12 MMOL/L (ref 10–20)
AST SERPL W P-5'-P-CCNC: 12 U/L (ref 9–39)
BILIRUB SERPL-MCNC: 1 MG/DL (ref 0–1.2)
BUN SERPL-MCNC: 22 MG/DL (ref 6–23)
CALCIUM SERPL-MCNC: 9.8 MG/DL (ref 8.6–10.3)
CHLORIDE SERPL-SCNC: 102 MMOL/L (ref 98–107)
CHOLEST SERPL-MCNC: 117 MG/DL (ref 0–199)
CHOLESTEROL/HDL RATIO: 2.5
CO2 SERPL-SCNC: 29 MMOL/L (ref 21–32)
CREAT SERPL-MCNC: 1.13 MG/DL (ref 0.5–1.3)
EGFRCR SERPLBLD CKD-EPI 2021: 69 ML/MIN/1.73M*2
ERYTHROCYTE [DISTWIDTH] IN BLOOD BY AUTOMATED COUNT: 12.8 % (ref 11.5–14.5)
GLUCOSE SERPL-MCNC: 105 MG/DL (ref 74–99)
HCT VFR BLD AUTO: 46.9 % (ref 41–52)
HDLC SERPL-MCNC: 47.4 MG/DL
HGB BLD-MCNC: 15.9 G/DL (ref 13.5–17.5)
LDLC SERPL CALC-MCNC: 50 MG/DL
MCH RBC QN AUTO: 31.2 PG (ref 26–34)
MCHC RBC AUTO-ENTMCNC: 33.9 G/DL (ref 32–36)
MCV RBC AUTO: 92 FL (ref 80–100)
NON HDL CHOLESTEROL: 70 MG/DL (ref 0–149)
NRBC BLD-RTO: 0 /100 WBCS (ref 0–0)
PLATELET # BLD AUTO: 439 X10*3/UL (ref 150–450)
POTASSIUM SERPL-SCNC: 4.1 MMOL/L (ref 3.5–5.3)
PROT SERPL-MCNC: 6.8 G/DL (ref 6.4–8.2)
PSA SERPL-MCNC: 1.45 NG/ML
RBC # BLD AUTO: 5.1 X10*6/UL (ref 4.5–5.9)
SODIUM SERPL-SCNC: 139 MMOL/L (ref 136–145)
TRIGL SERPL-MCNC: 100 MG/DL (ref 0–149)
VLDL: 20 MG/DL (ref 0–40)
WBC # BLD AUTO: 14 X10*3/UL (ref 4.4–11.3)

## 2024-09-10 PROCEDURE — G0103 PSA SCREENING: HCPCS

## 2024-09-10 PROCEDURE — 85027 COMPLETE CBC AUTOMATED: CPT

## 2024-09-10 PROCEDURE — 80061 LIPID PANEL: CPT

## 2024-09-10 PROCEDURE — 80053 COMPREHEN METABOLIC PANEL: CPT

## 2024-09-10 PROCEDURE — 36415 COLL VENOUS BLD VENIPUNCTURE: CPT

## 2024-09-10 NOTE — TELEPHONE ENCOUNTER
----- Message from Yfn Gibbs sent at 9/10/2024 12:56 PM EDT -----  WBC sl elevated.  Ask pt about any fever or other symptoms

## 2024-09-10 NOTE — TELEPHONE ENCOUNTER
Patient aware and verbalized understanding    He states that he has not felt sick, but his voice has been scratchy.

## 2024-09-12 ENCOUNTER — APPOINTMENT (OUTPATIENT)
Dept: PRIMARY CARE | Facility: CLINIC | Age: 69
End: 2024-09-12
Payer: MEDICARE

## 2024-09-12 VITALS
BODY MASS INDEX: 26.07 KG/M2 | SYSTOLIC BLOOD PRESSURE: 130 MMHG | HEIGHT: 71 IN | WEIGHT: 186.2 LBS | TEMPERATURE: 97.8 F | HEART RATE: 97 BPM | DIASTOLIC BLOOD PRESSURE: 78 MMHG

## 2024-09-12 DIAGNOSIS — D72.829 LEUKOCYTOSIS, UNSPECIFIED TYPE: ICD-10-CM

## 2024-09-12 DIAGNOSIS — E78.5 HYPERLIPIDEMIA, UNSPECIFIED HYPERLIPIDEMIA TYPE: ICD-10-CM

## 2024-09-12 DIAGNOSIS — E66.3 OVERWEIGHT WITH BODY MASS INDEX (BMI) OF 27 TO 27.9 IN ADULT: ICD-10-CM

## 2024-09-12 DIAGNOSIS — I10 PRIMARY HYPERTENSION: ICD-10-CM

## 2024-09-12 DIAGNOSIS — Z78.9 NEVER SMOKED CIGARETTES: ICD-10-CM

## 2024-09-12 DIAGNOSIS — Z00.00 MEDICARE ANNUAL WELLNESS VISIT, SUBSEQUENT: ICD-10-CM

## 2024-09-12 DIAGNOSIS — E05.90 HYPERTHYROIDISM: ICD-10-CM

## 2024-09-12 DIAGNOSIS — E66.3 OVERWEIGHT WITH BODY MASS INDEX (BMI) OF 25 TO 25.9 IN ADULT: ICD-10-CM

## 2024-09-12 PROBLEM — J01.90 ACUTE SINUSITIS: Status: RESOLVED | Noted: 2024-05-31 | Resolved: 2024-09-12

## 2024-09-12 PROBLEM — L03.019 CELLULITIS OF FINGER: Status: RESOLVED | Noted: 2024-05-31 | Resolved: 2024-09-12

## 2024-09-12 PROBLEM — M71.342 OTHER BURSAL CYST, LEFT HAND: Status: RESOLVED | Noted: 2023-12-14 | Resolved: 2024-09-12

## 2024-09-12 PROBLEM — L72.3 SEBACEOUS CYST: Status: RESOLVED | Noted: 2024-05-31 | Resolved: 2024-09-12

## 2024-09-12 PROBLEM — U07.1 COVID-19 VIRUS INFECTION: Status: RESOLVED | Noted: 2024-05-31 | Resolved: 2024-09-12

## 2024-09-12 PROBLEM — S61.239A PUNCTURE WOUND OF FINGER: Status: RESOLVED | Noted: 2024-05-31 | Resolved: 2024-09-12

## 2024-09-12 PROBLEM — M77.12 LATERAL EPICONDYLITIS OF LEFT ELBOW: Status: RESOLVED | Noted: 2024-05-31 | Resolved: 2024-09-12

## 2024-09-12 PROCEDURE — 1160F RVW MEDS BY RX/DR IN RCRD: CPT | Performed by: FAMILY MEDICINE

## 2024-09-12 PROCEDURE — 1170F FXNL STATUS ASSESSED: CPT | Performed by: FAMILY MEDICINE

## 2024-09-12 PROCEDURE — 1123F ACP DISCUSS/DSCN MKR DOCD: CPT | Performed by: FAMILY MEDICINE

## 2024-09-12 PROCEDURE — 1159F MED LIST DOCD IN RCRD: CPT | Performed by: FAMILY MEDICINE

## 2024-09-12 PROCEDURE — G0439 PPPS, SUBSEQ VISIT: HCPCS | Performed by: FAMILY MEDICINE

## 2024-09-12 PROCEDURE — 3078F DIAST BP <80 MM HG: CPT | Performed by: FAMILY MEDICINE

## 2024-09-12 PROCEDURE — 3008F BODY MASS INDEX DOCD: CPT | Performed by: FAMILY MEDICINE

## 2024-09-12 PROCEDURE — 1036F TOBACCO NON-USER: CPT | Performed by: FAMILY MEDICINE

## 2024-09-12 PROCEDURE — 1158F ADVNC CARE PLAN TLK DOCD: CPT | Performed by: FAMILY MEDICINE

## 2024-09-12 PROCEDURE — 3075F SYST BP GE 130 - 139MM HG: CPT | Performed by: FAMILY MEDICINE

## 2024-09-12 RX ORDER — SIMVASTATIN 20 MG/1
20 TABLET, FILM COATED ORAL NIGHTLY
Qty: 90 TABLET | Refills: 1 | Status: SHIPPED | OUTPATIENT
Start: 2024-09-12

## 2024-09-12 RX ORDER — ATENOLOL 50 MG/1
75 TABLET ORAL DAILY
Qty: 135 TABLET | Refills: 1 | Status: SHIPPED | OUTPATIENT
Start: 2024-09-12

## 2024-09-12 RX ORDER — AMLODIPINE BESYLATE 5 MG/1
5 TABLET ORAL 2 TIMES DAILY
Qty: 180 TABLET | Refills: 1 | Status: SHIPPED | OUTPATIENT
Start: 2024-09-12

## 2024-09-12 RX ORDER — LOSARTAN POTASSIUM 100 MG/1
100 TABLET ORAL DAILY
Qty: 90 TABLET | Refills: 1 | Status: SHIPPED | OUTPATIENT
Start: 2024-09-12

## 2024-09-12 RX ORDER — POTASSIUM CHLORIDE 750 MG/1
10 TABLET, EXTENDED RELEASE ORAL DAILY
Qty: 90 TABLET | Refills: 1 | Status: SHIPPED | OUTPATIENT
Start: 2024-09-12

## 2024-09-12 ASSESSMENT — ACTIVITIES OF DAILY LIVING (ADL)
GROCERY_SHOPPING: INDEPENDENT
DRESSING: INDEPENDENT
BATHING: INDEPENDENT
MANAGING_FINANCES: INDEPENDENT
TAKING_MEDICATION: INDEPENDENT
DOING_HOUSEWORK: INDEPENDENT

## 2024-09-12 ASSESSMENT — ENCOUNTER SYMPTOMS
ALLERGIC/IMMUNOLOGIC NEGATIVE: 1
RESPIRATORY NEGATIVE: 1
ENDOCRINE NEGATIVE: 1
GASTROINTESTINAL NEGATIVE: 1
HEMATOLOGIC/LYMPHATIC NEGATIVE: 1
BACK PAIN: 1
CONSTITUTIONAL NEGATIVE: 1
PSYCHIATRIC NEGATIVE: 1
EYES NEGATIVE: 1
CARDIOVASCULAR NEGATIVE: 1

## 2024-09-12 ASSESSMENT — PATIENT HEALTH QUESTIONNAIRE - PHQ9
2. FEELING DOWN, DEPRESSED OR HOPELESS: NOT AT ALL
1. LITTLE INTEREST OR PLEASURE IN DOING THINGS: NOT AT ALL
1. LITTLE INTEREST OR PLEASURE IN DOING THINGS: NOT AT ALL
SUM OF ALL RESPONSES TO PHQ9 QUESTIONS 1 AND 2: 0
SUM OF ALL RESPONSES TO PHQ9 QUESTIONS 1 AND 2: 0
2. FEELING DOWN, DEPRESSED OR HOPELESS: NOT AT ALL

## 2024-09-12 NOTE — PROGRESS NOTES
Valente Reynolds is here for his annual wellness visit.  The patient fell off a ladder 2 weeks ago landing on his lumbar spine area.  He denies any head or neck injury.  He went to a med center and was diagnosed with an L1 compression fracture.  He was then seen by orthopedics and was recommended monitoring with activity restrictions.  His pain has slowly been improving.  His range of motion has improved.  There is still some underlying discomfort with prolonged sitting and with various range of motion's.  Otherwise he has been doing well.  He has no other complaints.  He continues on his meds noted.    Patient ID: Rodolfo Wellington is a 69 y.o. male who presents for Medicare Annual Wellness Visit Subsequent (AWV):    Problem List Items Addressed This Visit       Primary hypertension    Hyperthyroidism    Hyperlipidemia     Other Visit Diagnoses       Medicare annual wellness visit, subsequent        Overweight with body mass index (BMI) of 25 to 25.9 in adult        Never smoked cigarettes               Past Medical History:   Diagnosis Date    Body mass index (BMI) 26.0-26.9, adult     BMI 26.0-26.9,adult    COVID-19 09/02/2022    COVID-19    Other specified counseling 08/16/2022    Counseled about COVID-19 virus infection    Overweight     Overweight      Past Surgical History:   Procedure Laterality Date    OTHER SURGICAL HISTORY  01/26/2022    Throat surgery    OTHER SURGICAL HISTORY  01/26/2022    Finger surgical procedure    OTHER SURGICAL HISTORY  02/15/2022    Colonoscopy      Family History   Problem Relation Name Age of Onset    Heart disease Mother      Dementia Father      Cancer Brother        Social History     Socioeconomic History    Marital status:      Spouse name: Not on file    Number of children: Not on file    Years of education: Not on file    Highest education level: Not on file   Occupational History    Not on file   Tobacco Use    Smoking status: Never    Smokeless tobacco: Never    Substance and Sexual Activity    Alcohol use: Yes     Alcohol/week: 4.0 standard drinks of alcohol     Types: 4 Standard drinks or equivalent per week    Drug use: Not Currently    Sexual activity: Not on file   Other Topics Concern    Not on file   Social History Narrative    Not on file     Social Determinants of Health     Financial Resource Strain: Not on file   Food Insecurity: Not on file   Transportation Needs: Not on file   Physical Activity: Not on file   Stress: Not on file   Social Connections: Not on file   Intimate Partner Violence: Not on file   Housing Stability: Not on file      Oxycodone-acetaminophen   Current Outpatient Medications   Medication Sig Dispense Refill    amLODIPine (Norvasc) 5 mg tablet Take 1 tablet (5 mg) by mouth 2 times a day. 180 tablet 1    aspirin 81 mg EC tablet Take 1 tablet (81 mg) by mouth once daily.      atenolol (Tenormin) 50 mg tablet Take 1.5 tablets (75 mg) by mouth once daily. 135 tablet 1    Klor-Con M10 10 mEq ER tablet Take 1 tablet (10 mEq) by mouth once daily. 90 tablet 1    losartan (Cozaar) 100 mg tablet Take 1 tablet (100 mg) by mouth once daily. 90 tablet 1    PreviDent 5000 Sensitive 1.1-5 % paste USE ONCE DAILY AT BEDTIME      simvastatin (Zocor) 20 mg tablet Take 1 tablet (20 mg) by mouth once daily at bedtime. 90 tablet 1     No current facility-administered medications for this visit.       Immunization History   Administered Date(s) Administered    Flu vaccine (IIV4), preservative free *Check age/dose* 11/01/2018    Flu vaccine, quadrivalent, high-dose, preservative free, age 65y+ (FLUZONE) 09/25/2020    Flu vaccine, quadrivalent, no egg protein, age 6 month or greater (FLUCELVAX) 12/04/2017    Flu vaccine, trivalent, preservative free, HIGH-DOSE, age 65y+ (Fluzone) 09/29/2018    Flu vaccine, trivalent, preservative free, age 6 months and greater (Fluarix/Fluzone/Flulaval) 11/03/2015    Influenza, Seasonal, Quadrivalent, Adjuvanted 10/29/2021,  10/05/2022, 10/02/2023    Influenza, Unspecified 11/01/2012, 11/01/2014, 11/01/2015, 12/05/2016, 10/01/2021    Influenza, seasonal, injectable 10/01/2020    Novel influenza-H1N1-09, preservative-free 01/04/2010    Pfizer COVID-19 vaccine, Fall 2023, 12 years and older, (30mcg/0.3mL) 11/16/2023    Pfizer COVID-19 vaccine, bivalent, age 12 years and older (30 mcg/0.3 mL) 10/20/2022    Pfizer Gray Cap SARS-CoV-2 04/21/2022    Pfizer Purple Cap SARS-CoV-2 03/11/2021, 04/08/2021, 10/21/2021, 04/22/2022    Pneumococcal conjugate vaccine, 13-valent (PREVNAR 13) 07/03/2018    Pneumococcal polysaccharide vaccine, 23-valent, age 2 years and older (PNEUMOVAX 23) 07/12/2019    Tdap vaccine, age 7 year and older (BOOSTRIX, ADACEL) 11/22/2023    Zoster vaccine, recombinant, adult (SHINGRIX) 08/29/2021, 12/03/2021    Zoster, live 02/05/2016        Review of Systems   Constitutional: Negative.    HENT: Negative.     Eyes: Negative.    Respiratory: Negative.     Cardiovascular: Negative.    Gastrointestinal: Negative.    Endocrine: Negative.    Genitourinary: Negative.    Musculoskeletal:  Positive for back pain (Low back pain secondary to falling injury).   Skin: Negative.    Allergic/Immunologic: Negative.    Hematological: Negative.    Psychiatric/Behavioral: Negative.     All other systems reviewed and are negative.       Vitals:    09/12/24 1131   BP: 130/78   Pulse: 97   Temp: 36.6 °C (97.8 °F)     Vitals:    09/12/24 1131   Weight: 84.5 kg (186 lb 3.2 oz)      Physical Exam  Constitutional:       General: He is not in acute distress.     Appearance: Normal appearance.   Neck:      Vascular: No carotid bruit.   Cardiovascular:      Rate and Rhythm: Normal rate and regular rhythm.      Pulses: Normal pulses.      Heart sounds: Normal heart sounds. No murmur heard.     No friction rub. No gallop.   Pulmonary:      Effort: Pulmonary effort is normal. No respiratory distress.      Breath sounds: Normal breath sounds. No wheezing  or rales.   Abdominal:      General: Abdomen is flat. Bowel sounds are normal. There is no distension.      Palpations: Abdomen is soft. There is no mass.      Tenderness: There is no abdominal tenderness. There is no guarding.   Musculoskeletal:      Cervical back: Neck supple.   Neurological:      General: No focal deficit present.      Mental Status: He is alert and oriented to person, place, and time. Mental status is at baseline.          ASSESSMENT/PLAN: Annual wellness visit.  Labs and colonoscopy are up-to-date.  Need to update RSV vaccination.    L1 compression fracture secondary to falling.  Recommended rest and avoidance of prolonged sitting.  He is to use a donut pillow and back pad for support.  Continue ibuprofen twice daily but switch to Tylenol when pain improves.  Avoid any heavy lifting or straining activities.  Follow-up with orthopedics as scheduled.  Call if any worsening of pain    Leukocytosis with WBC 14,000.  Patient denies any infection symptoms at the present time which were reviewed with him.  Recheck CBCD in 2 to 3 weeks    Hypertension stable.  Home blood pressures are normal except occasional systolic readings in the 140s.  Continue current meds noted    Hyperlipidemia stable.  Continue simvastatin daily.    Follow-up for recheck in 6 months and call as needed     Scribe Attestation  By signing my name below, I, Bee Harding LPN, Scribe   attest that this documentation has been prepared under the direction and in the presence of Yfn Gibbs MD.

## 2024-10-03 ENCOUNTER — HOSPITAL ENCOUNTER (OUTPATIENT)
Dept: RADIOLOGY | Facility: CLINIC | Age: 69
Discharge: HOME | End: 2024-10-03
Payer: MEDICARE

## 2024-10-03 ENCOUNTER — APPOINTMENT (OUTPATIENT)
Dept: ORTHOPEDIC SURGERY | Facility: CLINIC | Age: 69
End: 2024-10-03
Payer: MEDICARE

## 2024-10-03 DIAGNOSIS — S32.010D CLOSED WEDGE COMPRESSION FRACTURE OF L1 VERTEBRA WITH ROUTINE HEALING: Primary | ICD-10-CM

## 2024-10-03 DIAGNOSIS — S32.010D CLOSED WEDGE COMPRESSION FRACTURE OF L1 VERTEBRA WITH ROUTINE HEALING: ICD-10-CM

## 2024-10-03 PROCEDURE — 72100 X-RAY EXAM L-S SPINE 2/3 VWS: CPT

## 2024-10-03 PROCEDURE — 1159F MED LIST DOCD IN RCRD: CPT | Performed by: PHYSICIAN ASSISTANT

## 2024-10-03 PROCEDURE — 99213 OFFICE O/P EST LOW 20 MIN: CPT | Performed by: PHYSICIAN ASSISTANT

## 2024-10-03 PROCEDURE — 1123F ACP DISCUSS/DSCN MKR DOCD: CPT | Performed by: PHYSICIAN ASSISTANT

## 2024-10-06 NOTE — PROGRESS NOTES
Physical Therapy Evaluation    Patient Name: Rodolfo Wellington  MRN: [id number]  Today's Date: 10/7/2024  Time Calculation  Start Time: 1747  Stop Time: 1829  Time Calculation (min): 42 min  PT Evaluation Time Entry  PT Evaluation (Low) Time Entry: 18  PT Therapeutic Procedures Time Entry  Therapeutic Exercise Time Entry: 9  Therapeutic Activity Time Entry: 15                   Current Problem  1. Right hip pain  Follow Up In Physical Therapy      2. Closed wedge compression fracture of L1 vertebra with routine healing  Referral to Physical Therapy    Follow Up In Physical Therapy        Insurance    Insurance reviewed   Visit number: 1  Approved number of visits: BMN      MEDICARE/AARP 2230 ($0 USED ) COPAY 0 DED 0   COVERAGE 80/100 OOP 0 AARP TO FOLLOW MEDICARE   NO AUTH REQ [id number]   Subjective   General:  Patient is a 69 year old male presenting with complaints of mid-lower back pain following a fall off of a ladder 5 weeks ago. He sustained a closed wedge fracture of L1. He has been evaluated in Orthopedic Surgery for this and is undergoing conservative management. He reports that he has improved a lot over the past couple of weeks. He reports that he is primarily experiencing lateral hip pain of his R hip. He reports that his back is doing pretty well. Now he is able to stand for about 20 minutes comfortably. Then his back muscles will get tired. He reports that he began to notice the discomfort into his hip a couple of weeks after his fall. This seemed to be going away, but it has remained along the outer aspect of his right hip. He has been taking ibuproefen and this seems to help. He reports aggravating activities including twisting and bending forward. He reports the discomfort is along the top aspect of his lateral hip. Relieving activities seem to be sitting. He reports that when he fell he might have landed on his right . He reports that when going up stairs he will feel it on his right hip. The patient's  goals for therapy are to reduce his right hip pain.  He will have lower back discomfort with sitting in hard chairs. He denies any cardiovascular or respiratory issues. He is taking blood pressure medication.   Precautions:   We discussed that compression fractures typically heal very well on their own without intervention over 6-8 weeks. Recommend no lifting more than 15 pounds, limiting bending/lifting/twisting at the waist, and no activities that strain low back until follow up. Encourage ambulation as tolerated. - Liss Watters PA-C note 8/29/24    HEATHER Randall, PT  Darrell Hart -    Yes okay to proceed without restrictions. Thank you :) -Inbox Message from Liss Watters PA-C 10/8/24           Pain:  6/10  Reviewed medical screening form with pt and medical screening assessed    Imaging:   Narrative & Impression   Interpreted By:  Elias Damon,   STUDY:  XR LUMBAR SPINE 2-3 VIEWS; ;  10/3/2024 11:48 am      INDICATION:  Signs/Symptoms:L1 compression fracture follow up.      ,S32.010D Wedge compression fracture of first lumbar vertebra,  subsequent encounter for fracture with routine healing      COMPARISON:  08/27/2024      ACCESSION NUMBER(S):  [id number]      ORDERING CLINICIAN:  LISS WATTERS      FINDINGS:  Lumbar Spine, two views      Compression fracture at L1 with significant loss of height and. There  mild multilevel spondylotic changes throughout the lumbar spine.  There is moderate facet disease L5-S1. There is no spondylolisthesis.      IMPRESSION:  Redemonstration of L1 compression fracture deformity. No change from  the prior          MACRO:  None      Signed by: Elias Damon 10/4/2024 5:57 PM     Objective   Observation: patient   Screening:  SL balance: R- 5  seconds, concordant lateral hip pain  L- 10  seconds      Transfers: Independent  Gait: Mild trendelenberg gait on right side    Lumbar ROM (* indicates pain)  Flex:  75 % full range  Ext:  50  % full  "range*  Sidebending: R-  75 % full range  L-  75 % full range*  Rotation: R- 75  % full range L-  75 % full range*        Repeated SKTC: reduced lower back tension      Supine Hip PROM (* indicates pain)  Flexion: L 130  degrees ; R  120  degrees  IR: L 45  degrees; R 40   degrees  ER: L 45  degrees; R:  45 degrees    Hip MMT and Muscular Performance (* indicates pain)  Flex: L  4+/5 ; R  4+/5  ABD: L   5/5; R   4/5, concordant lateral hip pain  IR: L- 5/5 R- 4/5  ER: L- 5/5 R- 5/5  Hip Bridge: 25% full hip extension               Palpation: Mild concordant TTP through right gluteus medius tendinous insertion    Outcome Measures:  Patient accidentally did not complete AMANDA at intake    Treatment:   -Patient education regarding recovery timeline, rehabilitation process and rehabilitation timeline, home exercise program demonstration and construction, review of precautions, and long term strategies to maximize functional capacity and functional independence 15 minutes  LTR 2 minutes  SKTC 1x10  Hip bridge 1x10  Gluteal sets 10-5\" holds    EDUCATION/HEP:    Assessment:  Patient is a 69 year old male presenting with complaints of lower back pain following a L1 compression wedge fracture following a fall from a ladder. Patient presents with the following primary physical and functional impairments and limitations:  mild limitations with lumbar spine flexion and extension ROM, mild discomfort with left lumbar rotation and sidebending, right lateral hip muscular weakness, impaired lower extremity posterior chain strength and subjective difficulty walking greater than 20 minutes .  Patient tolerated today's evaluation and treatment performed well. Patient is displaying good prognosis for physical therapy care based upon subjective and objective assessment. Patient will benefit from skilled intervention to address the above mentioned impairments to maximize functional capacity and quality of life. Patient appeared to " understand all education provided.  No adverse reactions were observed or reported from patient at conclusion of today's session.           Clinical Presentation: Stable   Level of Complexity: Low   Goals:  Pt will report at least 75% improvement in  pain during everyday activities.  Pt will demo full and symmetrical AROM of lumbar spine without pain.  Pt will improve Oswestry by at least 10 points (MCID) to reflect improvement in ADLs/pain reduction.   Pt will demonstrate independence and report compliance with HEP.  Pt. Will report return to walking for greater than 30 minutes with minimal pain/limitation, indicating improved desired functional capacity   Patient will display 5/5 strength with hip IR, flexion and abduction  Patient will display ability to complete hip bridge to full hip extension, indicating improved functional lower extremity posterior chain strength    Plan  2x/week for  8 visits     Skilled therapeutic intervention to address the above mentioned physical and functional impairments and limitations including, but not limited to: patient education, therapeutic exercise, therapeutic activity, manual therapy, body mechanics training, dry needling, blood flow restriction training, instrumented soft tissue mobilization, manual soft tissue mobilization, gait retraining, biofeedback, cryotherapy, electrical stimulation, home program development, hot pack, taping, neuromuscular re-education, self-care/home management, and vasopneumatic compression.

## 2024-10-07 ENCOUNTER — EVALUATION (OUTPATIENT)
Dept: PHYSICAL THERAPY | Facility: CLINIC | Age: 69
End: 2024-10-07
Payer: MEDICARE

## 2024-10-07 DIAGNOSIS — S32.010D CLOSED WEDGE COMPRESSION FRACTURE OF L1 VERTEBRA WITH ROUTINE HEALING: ICD-10-CM

## 2024-10-07 DIAGNOSIS — M25.551 RIGHT HIP PAIN: Primary | ICD-10-CM

## 2024-10-07 PROCEDURE — 97161 PT EVAL LOW COMPLEX 20 MIN: CPT | Mod: GP | Performed by: PHYSICAL THERAPIST

## 2024-10-07 PROCEDURE — 97110 THERAPEUTIC EXERCISES: CPT | Mod: GP | Performed by: PHYSICAL THERAPIST

## 2024-10-07 PROCEDURE — 97530 THERAPEUTIC ACTIVITIES: CPT | Mod: GP | Performed by: PHYSICAL THERAPIST

## 2024-10-07 ASSESSMENT — PAIN - FUNCTIONAL ASSESSMENT: PAIN_FUNCTIONAL_ASSESSMENT: 0-10

## 2024-10-07 ASSESSMENT — PATIENT HEALTH QUESTIONNAIRE - PHQ9
1. LITTLE INTEREST OR PLEASURE IN DOING THINGS: NOT AT ALL
SUM OF ALL RESPONSES TO PHQ9 QUESTIONS 1 AND 2: 0
2. FEELING DOWN, DEPRESSED OR HOPELESS: NOT AT ALL

## 2024-10-07 ASSESSMENT — PAIN SCALES - GENERAL: PAINLEVEL_OUTOF10: 5 - MODERATE PAIN

## 2024-10-07 ASSESSMENT — ENCOUNTER SYMPTOMS
OCCASIONAL FEELINGS OF UNSTEADINESS: 0
LOSS OF SENSATION IN FEET: 0
DEPRESSION: 0

## 2024-10-09 NOTE — PROGRESS NOTES
HPI:  Rodolfo Wellington is a 69 y.o. male who presents to the spine clinic for follow up of L1 compression fracture. LOV 08/29/24.     Reports pain has finally felt better over the lat 2 weeks. He is finding it easier to stand and walk for any length of time however still limited by back discomfort and general deconditioning. Reports right hip pain with ambulation; otherwise no LE radicular symptoms.     Has been using plaint tylenol for pain.     ROS:  Reviewed on EMR and patient intake sheet.    PMH/SH:  Reviewed on EMR and patient intake sheet.    Exam:  Physical Exam  Spine Musculoskeletal Exam    Well appearing, NAD  Pleasant & cooperative  BMI 26.50  Decreased ROM lumbar spine with rotation, flexion/extension  no paraspinal muscle spasms  NTTP thoracolumbar junction  lower extremity sensation intact to light touch  lower extremity motor 5/5 throughout  normal gait & station; ambulates independently     Radiology:     Xrays lumbar spine done in the office today 10/03/24 personally reviewed and compared to prior imaging 08/27/24. Progression of L1 wedge compression fracture. Focal kyphosis at T12-L1     Assessment and Plan:  1. Closed wedge compression fracture of L1 vertebra with routine healing  - XR lumbar spine 2-3 views; Future  - Referral to Physical Therapy; Future    I reviewed the Xrays with Rodolfo today and reassured everything appears to be healing well. At this point recommend he start outpatient physical therapy with focus on ROM and core strengthening.   He can increase his activity as tolerated.     Patient in agreement to plan of care. Of course Rodolfo Wellington is welcome to call at anytime with questions or concerns.     Liss Watters PA-C  Department of Orthopaedic Surgery    [address] [place] [address]    Voicemail: [phone]   Appts: [phone]  Fax: [phone]      
Home

## 2024-10-15 ENCOUNTER — OFFICE VISIT (OUTPATIENT)
Dept: ORTHOPEDIC SURGERY | Facility: CLINIC | Age: 69
End: 2024-10-15
Payer: MEDICARE

## 2024-10-15 ENCOUNTER — HOSPITAL ENCOUNTER (OUTPATIENT)
Dept: RADIOLOGY | Facility: CLINIC | Age: 69
Discharge: HOME | End: 2024-10-15
Payer: MEDICARE

## 2024-10-15 DIAGNOSIS — M25.551 PAIN OF RIGHT HIP: ICD-10-CM

## 2024-10-15 DIAGNOSIS — S76.011A HIP STRAIN, RIGHT, INITIAL ENCOUNTER: Primary | ICD-10-CM

## 2024-10-15 PROCEDURE — 99213 OFFICE O/P EST LOW 20 MIN: CPT | Performed by: ORTHOPAEDIC SURGERY

## 2024-10-15 PROCEDURE — 73502 X-RAY EXAM HIP UNI 2-3 VIEWS: CPT | Mod: RT

## 2024-10-16 NOTE — PROGRESS NOTES
"Physical Therapy Treatment    Patient Name: Rodolfo Wellington  MRN: [id number]  Today's Date: 10/17/2024  Time Calculation  Start Time: 0931  Stop Time: 1015  Time Calculation (min): 44 min     PT Therapeutic Procedures Time Entry  Manual Therapy Time Entry: 10  Therapeutic Exercise Time Entry: 34                 Insurance reviewed   Visit number:   Approved number of visits: BMN       MEDICARE/AARP 2230 ($0 USED ) COPAY 0 DED 0   COVERAGE 80/100 OOP 0 AARP TO FOLLOW MEDICARE   NO AUTH REQ [id number]   Current Problem  1. Closed wedge compression fracture of L1 vertebra with routine healing        2. Right hip pain            Subjective   General  Patient reports that he saw an orthopedic physician downstairs and had his hip evaluated. He was told it was a potential hip muscle strain and to continue PT. He reports he primarily continues to notice right lateral hip pain with prolonged walking. He will feel more discomfort through the right hip region with prolonged standing and walking. Patient reports that over the past couple of days, \"it has taken a little longer\" for the right hip to get sore with activity  Precautions    Pain  5/10 on average    Objective   Lumbar Spine Rotation: 80% full range no pain  Lumbar Spine sidebending AROM: 80% full range bilaterally, mild discomfort with right sidebending  Palpation: Concordant TTP through superior-medial right gluteus zita  Treatments:  -STM through right superior-lateral hip musculature 10 minutes  -R hip cross body lateral hip stretch 3x30\"  -R hip CR stretch into flexion 3x30-6    -Hooklying hip abduction isometric holds 2x5-10\" holds  -Hooklying clamshell 2x10-12 YTB, RTB  -Hip Bridge 2x10 (2nd with hip abduction iso)  -Standing Hip abduction 2x8  -Standing hip extension 1x8       Outcome Measures:  Other Measures  Oswestry Disablity Index (AMANDA): 19    OP EDUCATION/HEP:  -Continue with prescribed HEP, increase amount of hip extension with hip bridge as " able    Assessment   Patient tolerated treatment well. Patient able to tolerate progression of posterior-lateral hip muscular loading well. Patient exhibited concordant tenderness to palpation with pressure through the medial-superior aspect of the right gluteus zita. Patient displayed increased difficulty and increase muscular fatigue with activities requiring standing full on right lower extremity. With ambulation, patient is displaying mild right antalgia. Patient appeared to understand all education provided. Will continue to benefit from skilled intervention to address the above mentioned impairments and limitations. No adverse reactions were observed or reported from patient at the conclusion of today's session.          Plan   Continue to progress posterior-lateral hip strengthening of right lower extremity

## 2024-10-16 NOTE — PROGRESS NOTES
New patient to me 69-year-old gentleman here for evaluation right-sided hip pain he states he has a history of a compression fracture is being treated by the back specialist but is been having some intermittent pain in the right hip area has been ongoing since his injury.  He states he had an episode when he was twisting and he felt some sharp pain and now he has pain along the top of the iliac crest on the right side does not hurt him all the time some days does not bother him at all other days bothers him all day long denies any groin pain denies any pain when he lays on that side denies any numbness or tingling or neurologic deficit    Location of pain: Along the iliac crest on the right side  Quality of pain: Intermittent pain comes and goes but has been ongoing  Modifying factors: Worse when he twists or pivots  Associated signs and symptoms: Denies any numbness or tingling or groin pain  Previous treatment: He recently started some physical therapy for his compression fracture        The patient's past medical history, family history, social history, and review of systems were documented on the patient's medical intake form.  The medical intake form was reviewed and scanned into the electronic medical record for future use.  History is otherwise negative except as stated in the HPI.    Physical exam    General: Alert and oriented to place, person, and time.  No acute distress and breathing comfortably; pleasant and cooperative with the examination.  HEENT: Head is normocephalic and atraumatic.  Neck: Supple, no visible swelling.  Cardiovascular: Good perfusion to the affected extremity.  Lungs: No audible wheezing or labored breathing.  Abdomen: Nondistended  HEME/Lymph : No visible abnormalities bilateral lower extremity    Extremity:  Examination of his right hip shows no tenderness in the bursal area most of his tenderness along the iliac crest to palpation no tenderness in the sciatic notch no pain  with internal/external rotation of the hip straight leg raise testing is negative good muscle strength distally brisk capillary refill compartments are soft calf is nontender flexion abduction external rotation maneuver is negative    Diagnostics:      XR lumbar spine 2-3 views    Result Date: 10/4/2024  Interpreted By:  Elias Damon, STUDY: XR LUMBAR SPINE 2-3 VIEWS; ;  10/3/2024 11:48 am   INDICATION: Signs/Symptoms:L1 compression fracture follow up.   ,S32.010D Wedge compression fracture of first lumbar vertebra, subsequent encounter for fracture with routine healing   COMPARISON: 08/27/2024   ACCESSION NUMBER(S): [id number]   ORDERING CLINICIAN: ROBERTO COTTER   FINDINGS: Lumbar Spine, two views   Compression fracture at L1 with significant loss of height and. There mild multilevel spondylotic changes throughout the lumbar spine. There is moderate facet disease L5-S1. There is no spondylolisthesis.       Redemonstration of L1 compression fracture deformity. No change from the prior     MACRO: None   Signed by: Elias Damon 10/4/2024 5:57 PM Dictation workstation:   [id number]         Procedures  [none   ]    Assessment:  Strain sprain right hip    Treatment plan:  1.  The natural history of the condition and its associated treatment alternatives including surgical and nonsurgical options were discussed with the patient at length.  2.  Patient was referred for possible injection for hip bursitis however he has no tenderness in the bursal area does not have pain when he lays on that side and his pain is more along the iliac crest not along the trochanteric bursa I think his main issue is more of a muscle strain in this area and I have recommended continued physical therapy to focus on his hip joint.  I explained this can take sometimes 6 to 8 weeks for complete resolution he is can follow-up with me in 6 weeks for repeat evaluation but at this time I see no indication for injection.  3. [   ]  4.  All  of the patient's questions were answered.    Orders Placed This Encounter    XR hip right with pelvis when performed 2 or 3 views    Referral to Physical Therapy       This note was prepared using voice recognition software.  The details of this note are correct and have been reviewed, and corrected to the best of my ability.  Some grammatical areas may persist related to the Dragon software    Los Aguilar MD  Senior Attending Physician  [facility]  [facility] [place]    [phone]

## 2024-10-17 ENCOUNTER — TREATMENT (OUTPATIENT)
Dept: PHYSICAL THERAPY | Facility: CLINIC | Age: 69
End: 2024-10-17
Payer: MEDICARE

## 2024-10-17 DIAGNOSIS — S32.010D CLOSED WEDGE COMPRESSION FRACTURE OF L1 VERTEBRA WITH ROUTINE HEALING: Primary | ICD-10-CM

## 2024-10-17 DIAGNOSIS — M25.551 RIGHT HIP PAIN: ICD-10-CM

## 2024-10-17 PROCEDURE — 97110 THERAPEUTIC EXERCISES: CPT | Mod: GP | Performed by: PHYSICAL THERAPIST

## 2024-10-17 PROCEDURE — 97140 MANUAL THERAPY 1/> REGIONS: CPT | Mod: GP | Performed by: PHYSICAL THERAPIST

## 2024-10-17 NOTE — PROGRESS NOTES
"Physical Therapy Treatment    Patient Name: Rodolfo Wellington  MRN: [id number]  Today's Date: 10/21/2024  Time Calculation  Start Time: 0505  Stop Time: 0545  Time Calculation (min): 40 min     PT Therapeutic Procedures Time Entry  Manual Therapy Time Entry: 10  Neuromuscular Re-Education Time Entry: 10  Therapeutic Exercise Time Entry: 18                 Current Problem  1. Closed wedge compression fracture of L1 vertebra with routine healing  Follow Up In Physical Therapy      2. Right hip pain  Follow Up In Physical Therapy          Insurance:  Visit number: 3/9  Approved number of visits: BMN       MEDICARE/AARP 2230 ($0 USED ) COPAY 0 DED 0   COVERAGE 80/100 OOP 0 AARP TO FOLLOW MEDICARE   NO AUTH REQ [id number]/ALL   Precautions      Yes okay to proceed without restrictions. Thank you :) -Inbox Message from Liss Watters PA-C 10/8/24          Subjective   Subjective:   Pt reports he is gradually getting better. IN the mornings he can comfortably walk around for about 30 min. His pain wraps around his low back.  Pain   3/10    Objective   Treatments:  -STM through right superior-lateral hip musculature 10 minutes  -R hip cross body lateral hip stretch 3x30\"  -R hip CR stretch into flexion 3x30-6     -Hooklying hip abduction isometric holds 2x5-10\" holds  -Hooklying clamshell 2x10-12 YTB, RTB*  -Hip Bridge 2x10 (2nd with hip abduction iso)  -Standing Hip abduction 1x10  -Standing hip extension 1x10  -Standing marching 1x10  -Standing HR 15x  -Standing squats 10x  -NBOS on airex EC 20\"x2     OP EDUCATION:   Marching w/ SLS      Assessment:   Pt had some increased back pain upon laying down for the supine exercises. Pt felt stiff with bridges, and had some limited hip elevation, especially first few reps. Pt felt a little pain in low back with squats. Displayed good SLB with marches. Normal fatigue following therapy session.    Plan:  progress posterior-lateral hip strengthening of right lower extremity             "

## 2024-10-21 ENCOUNTER — TREATMENT (OUTPATIENT)
Dept: PHYSICAL THERAPY | Facility: CLINIC | Age: 69
End: 2024-10-21
Payer: MEDICARE

## 2024-10-21 DIAGNOSIS — S32.010D CLOSED WEDGE COMPRESSION FRACTURE OF L1 VERTEBRA WITH ROUTINE HEALING: Primary | ICD-10-CM

## 2024-10-21 DIAGNOSIS — M25.551 RIGHT HIP PAIN: ICD-10-CM

## 2024-10-21 PROCEDURE — 97140 MANUAL THERAPY 1/> REGIONS: CPT | Mod: GP,CQ

## 2024-10-21 PROCEDURE — 97110 THERAPEUTIC EXERCISES: CPT | Mod: GP,CQ

## 2024-10-21 PROCEDURE — 97112 NEUROMUSCULAR REEDUCATION: CPT | Mod: GP,CQ

## 2024-10-24 ENCOUNTER — TREATMENT (OUTPATIENT)
Dept: PHYSICAL THERAPY | Facility: CLINIC | Age: 69
End: 2024-10-24
Payer: MEDICARE

## 2024-10-24 DIAGNOSIS — M25.551 RIGHT HIP PAIN: ICD-10-CM

## 2024-10-24 DIAGNOSIS — S32.010D CLOSED WEDGE COMPRESSION FRACTURE OF L1 VERTEBRA WITH ROUTINE HEALING: Primary | ICD-10-CM

## 2024-10-24 PROCEDURE — 97110 THERAPEUTIC EXERCISES: CPT | Mod: GP | Performed by: PHYSICAL THERAPIST

## 2024-10-24 PROCEDURE — 97140 MANUAL THERAPY 1/> REGIONS: CPT | Mod: GP | Performed by: PHYSICAL THERAPIST

## 2024-10-24 NOTE — PROGRESS NOTES
"Physical Therapy Treatment    Patient Name: Rodolfo Wellington  MRN: [id number]  Today's Date: 10/24/2024  Time Calculation  Start Time: 1418  Stop Time: 1504  Time Calculation (min): 46 min     PT Therapeutic Procedures Time Entry  Manual Therapy Time Entry: 10  Neuromuscular Re-Education Time Entry: 3  Therapeutic Exercise Time Entry: 33                 Current Problem  1. Closed wedge compression fracture of L1 vertebra with routine healing        2. Right hip pain              Insurance:  Visit number: 4/9  Approved number of visits: BMN       MEDICARE/AARP 2230 ($0 USED ) COPAY 0 DED 0   COVERAGE 80/100 OOP 0 AARP TO FOLLOW MEDICARE   NO AUTH REQ [id number]/ALL   Precautions      Yes okay to proceed without restrictions. Thank you :) -Inbox Message from Liss Watters PA-C 10/8/24          Subjective   Subjective:   Pt reports that he has been feeling pretty good today. He has been able to walk and stand for bouts greater than 30 minutes. He will feel the discomfort in his hip and back with prolonged activity  Pain   3/10    Objective   Treatments:  -STM through right superior-lateral hip musculature 10 minutes  -R hip cross body lateral hip stretch 3x30\"       -Hooklying clamshell 2x6-12 GTB  -Hip Bridge 2x5 YTB abduction iso  -Standing Hip abduction 2x10   -Standing hip extension 2x10   -Hooklying diaphragmatic breathing with hip abduction iso 2x6  -Hooklying marching 2x8     Tandem Stance Holds 2x30\" bilaterally  OP EDUCATION:   Continue with previously prescribed HEP  Access Code: [email or web address]  URL: [email or web address]  Date: 10/24/2024  Prepared by: Jaiden Raphael    Exercises  - Tandem Stance with Support  - 2 x daily - 7 x weekly - 30 hold    Assessment:   Patient tolerated treatment well. Patient tolerated progression of posterior lateral hip muscular loading. Patient did report fatigue with activities directed toward strengthening of this area. Patient also was able to tolerate progression of " Identify and practice 3 coping skills to manage anxiety lumbopelvic stability training well, although he did report mild anterior hip and lower back musculature with this. Overall, patient is tolerating gradual progressions of posterior hip and lumbopelvic strengthening/load progressions well.  Patient appeared to understand all education provided. Will continue to benefit from skilled intervention to address the above mentioned impairments and limitations. No adverse reactions were observed or reported from patient at the conclusion of today's session.          Plan:  progress posterior-lateral hip strengthening of right lower extremity              Identify and practice 3 coping skills to manage anxiety Identify and practice 3 coping skills to manage anxiety Identify and practice 3 coping skills to manage anxiety Identify and practice 3 coping skills to manage anxiety Identify and practice 3 coping skills to manage anxiety Identify and practice 3 coping skills to manage anxiety Identify and practice 3 coping skills to manage anxiety Identify and practice 3 coping skills to manage anxiety

## 2024-10-28 ENCOUNTER — TREATMENT (OUTPATIENT)
Dept: PHYSICAL THERAPY | Facility: CLINIC | Age: 69
End: 2024-10-28
Payer: MEDICARE

## 2024-10-28 DIAGNOSIS — S32.010D CLOSED WEDGE COMPRESSION FRACTURE OF L1 VERTEBRA WITH ROUTINE HEALING: ICD-10-CM

## 2024-10-28 DIAGNOSIS — M25.551 RIGHT HIP PAIN: ICD-10-CM

## 2024-10-28 PROCEDURE — 97140 MANUAL THERAPY 1/> REGIONS: CPT | Mod: GP,CQ

## 2024-10-28 PROCEDURE — 97110 THERAPEUTIC EXERCISES: CPT | Mod: GP,CQ

## 2024-10-31 ENCOUNTER — TREATMENT (OUTPATIENT)
Dept: PHYSICAL THERAPY | Facility: CLINIC | Age: 69
End: 2024-10-31
Payer: MEDICARE

## 2024-10-31 DIAGNOSIS — S32.010D CLOSED WEDGE COMPRESSION FRACTURE OF L1 VERTEBRA WITH ROUTINE HEALING: Primary | ICD-10-CM

## 2024-10-31 DIAGNOSIS — M25.551 RIGHT HIP PAIN: ICD-10-CM

## 2024-10-31 PROCEDURE — 97110 THERAPEUTIC EXERCISES: CPT | Mod: GP | Performed by: PHYSICAL THERAPIST

## 2024-10-31 PROCEDURE — 97140 MANUAL THERAPY 1/> REGIONS: CPT | Mod: GP | Performed by: PHYSICAL THERAPIST

## 2024-11-04 ENCOUNTER — APPOINTMENT (OUTPATIENT)
Dept: PHYSICAL THERAPY | Facility: CLINIC | Age: 69
End: 2024-11-04
Payer: MEDICARE

## 2024-11-04 NOTE — PROGRESS NOTES
"Physical Therapy Treatment    Patient Name: Rodolfo Wellington  MRN: [id number]  Today's Date: 11/4/2024             Insurance:  Visit number: 6/9  Approved number of visits: BMN       MEDICARE/AARP 2230 ($0 USED ) COPAY 0 DED 0   COVERAGE 80/100 OOP 0 AARP TO FOLLOW MEDICARE   NO AUTH REQ [id number]/ALL     Current Problem  No diagnosis found.        Subjective   General   Pt. Reports     Precautions   Yes okay to proceed without restrictions. Thank you :) -Inbox Message from Liss Watters PA-C 10/8/24   Pain       Objective   Treatments:   -STM roller through right superior-lateral hip musculature 10 minutes       Sidelying clamshell 2x10-12  -Hooklying clamshell 2\" 2x10 Blue TB  -Hip Bridge 2x10 GTB abduction iso  -Standing Hip abduction 2x10 YTB  -Standing hip extension 2x10 YTB  -Hooklying UE/LE rhythmic isometrics 2x5-3\" holds  -Hooklying opp arm/opp leg iso ball presses 2x5-3\" holds         Assessment:   Patient tolerated treatment well. . Overall, his tolerance for weight bearing activity has seemed to improve both in therapy and via subjective report.  Patient appeared to understand all education provided. Will continue to benefit from skilled intervention to address the above mentioned impairments and limitations. No adverse reactions were observed or reported from patient at the conclusion of today's session.          Plan:       OP EDUCATION:         "

## 2024-11-07 ENCOUNTER — TREATMENT (OUTPATIENT)
Dept: PHYSICAL THERAPY | Facility: CLINIC | Age: 69
End: 2024-11-07
Payer: MEDICARE

## 2024-11-07 DIAGNOSIS — S32.010D CLOSED WEDGE COMPRESSION FRACTURE OF L1 VERTEBRA WITH ROUTINE HEALING: Primary | ICD-10-CM

## 2024-11-07 DIAGNOSIS — M25.551 RIGHT HIP PAIN: ICD-10-CM

## 2024-11-07 PROCEDURE — 97110 THERAPEUTIC EXERCISES: CPT | Mod: GP | Performed by: PHYSICAL THERAPIST

## 2024-11-07 PROCEDURE — 97530 THERAPEUTIC ACTIVITIES: CPT | Mod: GP | Performed by: PHYSICAL THERAPIST

## 2024-11-07 PROCEDURE — 97140 MANUAL THERAPY 1/> REGIONS: CPT | Mod: GP | Performed by: PHYSICAL THERAPIST

## 2024-11-07 NOTE — PROGRESS NOTES
Physical Therapy Treatment    Patient Name: Rodolfo Wellington  MRN: [id number]  Today's Date: 11/7/24  Time Calculation  Start Time: 1418  Stop Time: 1503  Time Calculation (min): 45 min  PT Therapeutic Procedures Time Entry  Manual Therapy Time Entry: 10  Therapeutic Exercise Time Entry: 20  Therapeutic Activity Time Entry: 15       Insurance:  Visit number: 7/9  Approved number of visits: BMN       MEDICARE/AARP 2230 ($0 USED ) COPAY 0 DED 0   COVERAGE 80/100 OOP 0 AARP TO FOLLOW MEDICARE   NO AUTH REQ [id number]/ALL     Current Problem  1. Closed wedge compression fracture of L1 vertebra with routine healing        2. Right hip pain                Subjective   General   Pt. Reports that the discomfort along the outside of his right hip has been improving He reports that when he is more active and exercises more he will notice the soreness through his lower back. He reports that his right hip is taking more and more to flare up. He is noticing this less and less. He reports he does notice discomfort through his lower back with bending forward and reaching for objects.     Precautions   Yes okay to proceed without restrictions. Thank you :) -Inbox Message from Liss Watters PA-C 10/8/24   Pain     3/10  Objective   Observation: patient   Screening:  SL balance: R- 5  seconds, concordant lateral hip pain  L- 10  seconds        Transfers: Independent  Gait: No compensations noted     Lumbar ROM (* indicates pain)  Flex:  75 % full range, tightness  Ext:  50  % full range  Sidebending: R-  75 % full range  L-  75 % full range  Rotation: R- 75  % full range L-  75 % full range                Supine Hip PROM (* indicates pain)  Flexion: L 130  degrees ; R  125  degrees  IR: L 45  degrees; R 40   degrees  ER: L 45  degrees; R:  45 degrees     Hip MMT and Muscular Performance (* indicates pain)  Flex: L  5/5 ; R  5/5  ABD: L   5/5; R   5-/5  IR: L- 5/5 R- 5/5  ER: L- 5/5 R- 5/5  Hip Bridge: 100% full hip extension                "     Palpation: Mild concordant TTP through proximal left gluteus medius/TFL muscle   Outcome Measures:  AMANDA: 17    Treatments:   -STM  through right superior-lateral hip musculature 10 minutes  Reassessment of objective measures, discussion of POC moving forward 15 minutes       -Hooklying clamshell 2\" 2x12 Blue TB (cueing for mild abdominal brace)  -Hip Bridge 2x6 GTB abduction iso  -Standing Hip abduction 1x10 on foam  -Standing hip extension 1x10 on foam           Assessment:   The patient has displayed improvement in left hip flexion, IR and abduction strength, improvement in static single leg balance bilaterally. Patient has also displayed improvement in ability to complete a hip bridge to full hip extension. Patient is also displaying ambulation without compensation.  Patient continues to display limitations and guarding with lumbar spine flexion and extension ROM. He is displaying mild limitations with bilateral lumbar spine lateral flexion and rotation, but these motions improve with repetition. Patient also continues to display fatigue with repetitive loading of the lumbopelvic complex in endurance and stabilization exercises.  The patient would continue to benefit from skilled intervention to address the above mentioned impairments to improve functional capacity and QOL. The patient appeared to understand all education given and displayed verbal agreement with therapy plan of care.          Goals:  Pt will report at least 75% improvement in  pain during everyday activities. PROGRESSING 70 %  Pt will demo full and symmetrical AROM of lumbar spine without pain. PROGRESSING  Pt will improve Oswestry by at least 10 points (MCID) to reflect improvement in ADLs/pain reduction. PROGRESSING  Pt will demonstrate independence and report compliance with HEP.  Pt. Will report return to walking for greater than 30 minutes with minimal pain/limitation, indicating improved desired functional capacity MET  Patient will " display 5/5 strength with hip IR, flexion and abduction progressing PROGRESSING  Patient will display ability to complete hip bridge to full hip extension, indicating improved functional lower extremity posterior chain strength MET  NEW Patient will report being able to walk for at least 45 minutes prior to onset of lower back discomfort  NEW Patient will be able to  object from the floor with modified independence  Plan:   1x a week for 4 visits    OP EDUCATION:   Continue with prescribed HEP

## 2024-11-14 ENCOUNTER — TREATMENT (OUTPATIENT)
Dept: PHYSICAL THERAPY | Facility: CLINIC | Age: 69
End: 2024-11-14
Payer: MEDICARE

## 2024-11-14 DIAGNOSIS — M25.551 RIGHT HIP PAIN: ICD-10-CM

## 2024-11-14 DIAGNOSIS — S32.010D CLOSED WEDGE COMPRESSION FRACTURE OF L1 VERTEBRA WITH ROUTINE HEALING: ICD-10-CM

## 2024-11-14 PROCEDURE — 97110 THERAPEUTIC EXERCISES: CPT | Mod: GP | Performed by: PHYSICAL THERAPIST

## 2024-11-14 PROCEDURE — 97140 MANUAL THERAPY 1/> REGIONS: CPT | Mod: GP | Performed by: PHYSICAL THERAPIST

## 2024-11-14 NOTE — PROGRESS NOTES
"Physical Therapy Treatment    Patient Name: Rodolfo Wellington  MRN: [id number]  Today's Date: 11/14/2024  Time Calculation  Start Time: 1336  Stop Time: 1415  Time Calculation (min): 39 min  PT Therapeutic Procedures Time Entry  Manual Therapy Time Entry: 10  Therapeutic Exercise Time Entry: 29       Insurance:  Visit number: 8  Approved number of visits: BMN       MEDICARE/AARP 2230 ($0 USED ) COPAY 0 DED 0   COVERAGE 80/100 OOP 0 AARP TO FOLLOW MEDICARE   NO AUTH REQ [id number]/ALL     Current Problem  1. Closed wedge compression fracture of L1 vertebra with routine healing  Follow Up In Physical Therapy      2. Right hip pain  Follow Up In Physical Therapy                Subjective   General   Pt. Reports that he continues to notice gradual improvement. Today he reports that he felt a little bit better when initially standing and getting ready this morning. Patient reports that he was raking leaves on Tuesday and he felt some discomfort through his lower back. He reports that his right hip has been feeling pretty good. He has had minima; discomfort in this area  Precautions   Yes okay to proceed without restrictions. Thank you :) -Inbox Message from Liss Watters PA-C 10/8/24   Pain     4/10  Objective       Treatments:   -STM  through right superior-lateral hip and lumbosacral musculature 10 minutes       Sidelying Clamshell 2x10 no weight  -Hooklying clamshell 2\" 2x8 Black TB (cueing for mild abdominal brace)  -Hooklying Counter rotation rhythmic isometrics 8-3\" holds bilaterally  -Hooklying Opp arm/opp leg pushes 8-3\" holds bilaterally  -Standing Hip abduction 2x6-10 on foam  -Standing marching on foam 3x30-45\"           Assessment:   Patient tolerated treatment well. Patient able to tolerate gradual progression of posterior lateral hip strengthening and lumbopelvic stability training. Patient was challenged with reintroduction of isometric holds of the lumbopelvic region. Patient is displaying reduce sensitivity to " direct palpation through the right lateral hip and lumbosacral region. Patient continues to report generalized lateral hip and lower back muscular fatigue with standing based activities, but was able to tolerate progression of these activities today.  Patient appeared to understand all education provided. Will continue to benefit from skilled intervention to address the above mentioned impairments and limitations. No adverse reactions were observed or reported from patient at the conclusion of today's session.          Plan:   Gradual progression of lumbopelvic stability training    OP EDUCATION:   Continue with prescribed HEP

## 2024-11-19 ENCOUNTER — TELEPHONE (OUTPATIENT)
Dept: PRIMARY CARE | Facility: CLINIC | Age: 69
End: 2024-11-19

## 2024-11-19 ENCOUNTER — LAB (OUTPATIENT)
Dept: LAB | Facility: LAB | Age: 69
End: 2024-11-19
Payer: MEDICARE

## 2024-11-19 DIAGNOSIS — D72.829 LEUKOCYTOSIS, UNSPECIFIED TYPE: ICD-10-CM

## 2024-11-19 LAB
BASOPHILS # BLD AUTO: 0.04 X10*3/UL (ref 0–0.1)
BASOPHILS NFR BLD AUTO: 0.5 %
EOSINOPHIL # BLD AUTO: 0.13 X10*3/UL (ref 0–0.4)
EOSINOPHIL NFR BLD AUTO: 1.6 %
ERYTHROCYTE [DISTWIDTH] IN BLOOD BY AUTOMATED COUNT: 12.6 % (ref 11.5–14.5)
HCT VFR BLD AUTO: 42.4 % (ref 41–52)
HGB BLD-MCNC: 14.8 G/DL (ref 13.5–17.5)
IMM GRANULOCYTES # BLD AUTO: 0.02 X10*3/UL (ref 0–0.5)
IMM GRANULOCYTES NFR BLD AUTO: 0.2 % (ref 0–0.9)
LYMPHOCYTES # BLD AUTO: 2.48 X10*3/UL (ref 0.8–3)
LYMPHOCYTES NFR BLD AUTO: 30.3 %
MCH RBC QN AUTO: 31.6 PG (ref 26–34)
MCHC RBC AUTO-ENTMCNC: 34.9 G/DL (ref 32–36)
MCV RBC AUTO: 91 FL (ref 80–100)
MONOCYTES # BLD AUTO: 0.81 X10*3/UL (ref 0.05–0.8)
MONOCYTES NFR BLD AUTO: 9.9 %
NEUTROPHILS # BLD AUTO: 4.7 X10*3/UL (ref 1.6–5.5)
NEUTROPHILS NFR BLD AUTO: 57.5 %
NRBC BLD-RTO: 0 /100 WBCS (ref 0–0)
PLATELET # BLD AUTO: 268 X10*3/UL (ref 150–450)
RBC # BLD AUTO: 4.68 X10*6/UL (ref 4.5–5.9)
WBC # BLD AUTO: 8.2 X10*3/UL (ref 4.4–11.3)

## 2024-11-19 PROCEDURE — 85025 COMPLETE CBC W/AUTO DIFF WBC: CPT

## 2024-11-19 PROCEDURE — 36415 COLL VENOUS BLD VENIPUNCTURE: CPT

## 2024-11-20 ENCOUNTER — TREATMENT (OUTPATIENT)
Dept: PHYSICAL THERAPY | Facility: CLINIC | Age: 69
End: 2024-11-20
Payer: MEDICARE

## 2024-11-20 DIAGNOSIS — M25.551 RIGHT HIP PAIN: ICD-10-CM

## 2024-11-20 DIAGNOSIS — S32.010D CLOSED WEDGE COMPRESSION FRACTURE OF L1 VERTEBRA WITH ROUTINE HEALING: ICD-10-CM

## 2024-11-20 PROCEDURE — 97110 THERAPEUTIC EXERCISES: CPT | Mod: GP | Performed by: PHYSICAL THERAPIST

## 2024-11-20 PROCEDURE — 97140 MANUAL THERAPY 1/> REGIONS: CPT | Mod: GP | Performed by: PHYSICAL THERAPIST

## 2024-11-20 NOTE — PROGRESS NOTES
"Physical Therapy Treatment    Patient Name: Rodolfo Wellington  MRN: [id number]  Today's Date: 11/20/2024  Time Calculation  Start Time: 1318  Stop Time: 1358  Time Calculation (min): 40 min  PT Therapeutic Procedures Time Entry  Manual Therapy Time Entry: 8  Therapeutic Exercise Time Entry: 32       Insurance:  Visit number: 9  Approved number of visits: BMN       MEDICARE/AARP 2230 ($0 USED ) COPAY 0 DED 0   COVERAGE 80/100 OOP 0 AARP TO FOLLOW MEDICARE   NO AUTH REQ [id number]/ALL     Current Problem  1. Closed wedge compression fracture of L1 vertebra with routine healing  Follow Up In Physical Therapy      2. Right hip pain  Follow Up In Physical Therapy                  Subjective   General   Pt. Reports that he is gradually trying to use his seat cushion less and this has been OK. He was able to mow for 45 minutes continuously. He reports some stiffness through his lower back, but this is not consistent  Precautions   Yes okay to proceed without restrictions. Thank you :) -Inbox Message from Liss Watters PA-C 10/8/24   Pain       Objective       Treatments:   -STM  through right superior-lateral hip and lumbosacral musculature 8 minutes       Sidelying Clamshell 2x6-8 YTB on R  -Hooklying Counter rotation rhythmic isometrics 2x8-3\" holds bilaterally  -Hooklying abdominal bracing with hip abduction isometric holds 2x8-3\"  -Hooklying diaphragmatic breathing x10  -Hooklying diaphragmatic breathing with hip abduction isometric holds 8 breaths  -Standing Hip abduction 2x10 on foam  -Elevated sit to stand 2x6-8(60 degrees)           Assessment:   Patient tolerated treatment well. Patient able to tolerate slight progression in intensity and volume of lumbopelvic strengthening/endurance training. Patient does continue to display early onset fatigue with these exercises but this has been improving.  Patient did require some cueing for abdominal bracing. Patient displayed some difficulty with achieving active posterior pelvic " tilt, but was able to complete with cueing. Patient tolerated introduction of elevated sit to stand without reports of lower back discomfort.  Patient appeared to understand all education provided. Will continue to benefit from skilled intervention to address the above mentioned impairments and limitations. No adverse reactions were observed or reported from patient at the conclusion of today's session.          Plan:   Continue to progress lumbopelvic stability and muscular endurance training    OP EDUCATION:   Continue with prescribed HEP, discussed today

## 2024-11-26 ENCOUNTER — TREATMENT (OUTPATIENT)
Dept: PHYSICAL THERAPY | Facility: CLINIC | Age: 69
End: 2024-11-26
Payer: MEDICARE

## 2024-11-26 DIAGNOSIS — S32.010D CLOSED WEDGE COMPRESSION FRACTURE OF L1 VERTEBRA WITH ROUTINE HEALING: ICD-10-CM

## 2024-11-26 DIAGNOSIS — M25.551 RIGHT HIP PAIN: ICD-10-CM

## 2024-11-26 PROCEDURE — 97110 THERAPEUTIC EXERCISES: CPT | Mod: GP | Performed by: PHYSICAL THERAPIST

## 2024-11-26 PROCEDURE — 97140 MANUAL THERAPY 1/> REGIONS: CPT | Mod: GP | Performed by: PHYSICAL THERAPIST

## 2024-11-26 NOTE — PROGRESS NOTES
"Physical Therapy Treatment    Patient Name: Rodolfo Wellington  MRN: [id number]  Today's Date: 11/26/2024  Time Calculation  Start Time: 1433  Stop Time: 1514  Time Calculation (min): 41 min  PT Therapeutic Procedures Time Entry  Manual Therapy Time Entry: 10  Therapeutic Exercise Time Entry: 31       Insurance:  Visit number: 9  Approved number of visits: BMN       MEDICARE/AARP 2230 ($0 USED ) COPAY 0 DED 0   COVERAGE 80/100 OOP 0 AARP TO FOLLOW MEDICARE   NO AUTH REQ [id number]/ALL     Current Problem  1. Closed wedge compression fracture of L1 vertebra with routine healing  Follow Up In Physical Therapy      2. Right hip pain  Follow Up In Physical Therapy                    Subjective   General   Pt. Reports that he woke up feeling good today. He reports that he was able to do a lot of shopping and yard work and felt a little bit of his lower back fatigue. He then was able to do other activities at home and he felt pretty good with this. He reports he felt a little sore after his session. He reports that the single leg balance exercise will have him feeling a little soreness in his back.   Precautions   Yes okay to proceed without restrictions. Thank you :) -Inbox Message from Liss Watters PA-C 10/8/24   Pain   3/10    Objective       Treatments:   -STM  through right superior-lateral hip and lumbosacral musculature 10 minutes       Sidelying Clamshell 2x8-12 YTB on bilaterally  -Hooklying Counter rotation rhythmic isometrics 2x8-3\" holds bilaterally  -Hooklying diaphragmatic breathing with hip abduction isometric holds 2x5-8 breaths  -Elevated sit to stand 2x6-8(70 degrees)  -Seated forward ball roll outs 2x5           Assessment:   Patient tolerated treatment well.  Patient tolerated progression of volume and intensity of lumbopelvic stability training. Per subjective report, patient is experiencing improved lumbopelvic muscular endurance during functional activity.  Patient was able to tolerate gentle introduction " to lumbar spine mobility training. Patient did report fatigue/stiffness following this, but tolerated well. Patient tolerated progression of sit to stand exercise well. Patient appeared to understand all education provided. Will continue to benefit from skilled intervention to address the above mentioned impairments and limitations. No adverse reactions were observed or reported from patient at the conclusion of today's session.          Plan:   Continue to gradually introduce gentle lumbar spine ROM based training    OP EDUCATION:   Continue with prescribed HEP, discussed today

## 2024-12-03 ENCOUNTER — APPOINTMENT (OUTPATIENT)
Dept: ORTHOPEDIC SURGERY | Facility: CLINIC | Age: 69
End: 2024-12-03
Payer: MEDICARE

## 2024-12-05 ENCOUNTER — TREATMENT (OUTPATIENT)
Dept: PHYSICAL THERAPY | Facility: CLINIC | Age: 69
End: 2024-12-05
Payer: MEDICARE

## 2024-12-05 DIAGNOSIS — S32.010D CLOSED WEDGE COMPRESSION FRACTURE OF L1 VERTEBRA WITH ROUTINE HEALING: ICD-10-CM

## 2024-12-05 DIAGNOSIS — M25.551 RIGHT HIP PAIN: ICD-10-CM

## 2024-12-05 PROCEDURE — 97110 THERAPEUTIC EXERCISES: CPT | Mod: GP,CQ

## 2024-12-05 NOTE — PROGRESS NOTES
"Physical Therapy Treatment    Patient Name: Rodolfo Wellington  MRN: [id number]  Today's Date: 12/5/2024  Time Calculation  Start Time: 1400  Stop Time: 1443  Time Calculation (min): 43 min  PT Therapeutic Procedures Time Entry  Therapeutic Exercise Time Entry: 40       Insurance:  Visit number: 10/12  Approved number of visits: BMN       MEDICARE/AARP 2230 ($0 USED ) COPAY 0 DED 0   COVERAGE 80/100 OOP 0 AARP TO FOLLOW MEDICARE   NO AUTH REQ [id number]/ALL     Current Problem  1. Closed wedge compression fracture of L1 vertebra with routine healing  Follow Up In Physical Therapy      2. Right hip pain  Follow Up In Physical Therapy          Subjective   General   Pt. Reports he still gets stiff w/ positional changes.   Precautions     Pain       Objective   Treatments:   Supine hip Add/Abd BTB 5\"x20  Bridges BTB 2x10  Supine marches BTB 2x10  Seated PB roll outs 2x10  STS 2x10  Stand hip 3-way x15ea    Assessment:   Pt. Progressing w/ all exercises well. Pt. Required several breaks today d/t fatigue which is to be expected. Added Bridges, supine marches, and stand hip 3-way for increasing strength w/ good pt. Tolerance. Pt. Will continue w/ current HEP.     Plan:   Continue to increase strength/mobility.     OP EDUCATION:       Goals:     "

## 2024-12-09 ENCOUNTER — TREATMENT (OUTPATIENT)
Dept: PHYSICAL THERAPY | Facility: CLINIC | Age: 69
End: 2024-12-09
Payer: MEDICARE

## 2024-12-09 DIAGNOSIS — M25.551 RIGHT HIP PAIN: ICD-10-CM

## 2024-12-09 DIAGNOSIS — S32.010D CLOSED WEDGE COMPRESSION FRACTURE OF L1 VERTEBRA WITH ROUTINE HEALING: Primary | ICD-10-CM

## 2024-12-09 PROCEDURE — 97530 THERAPEUTIC ACTIVITIES: CPT | Mod: GP | Performed by: PHYSICAL THERAPIST

## 2024-12-09 PROCEDURE — 97110 THERAPEUTIC EXERCISES: CPT | Mod: GP | Performed by: PHYSICAL THERAPIST

## 2024-12-09 NOTE — PROGRESS NOTES
"Physical Therapy Treatment    Patient Name: Rodolfo Wellington  MRN: [id number]  Today's Date: 12/9/2024  Time Calculation  Start Time: 1616  Stop Time: 1700  Time Calculation (min): 44 min  PT Therapeutic Procedures Time Entry  Therapeutic Exercise Time Entry: 29  Therapeutic Activity Time Entry: 15       Insurance:  Visit number: 11/12  Approved number of visits: BMN       MEDICARE/AARP 2230 ($0 USED ) COPAY 0 DED 0   COVERAGE 80/100 OOP 0 AARP TO FOLLOW MEDICARE   NO AUTH REQ [id number]/ALL     Current Problem  1. Closed wedge compression fracture of L1 vertebra with routine healing        2. Right hip pain              Subjective   General   Pt. Reports that he did a good bit of raking yesterday and he is a little fatigued but overall this is OK. He reports that he has been sitting more comfortably with pads. He reports that he is able to stand for longer periods of time. He reports that he will experience some discomfort with this. He reports that he is about 60% better.   Precautions     Pain    5/10    Objective   Observation: patient   Screening:  SL balance: R- 20 seconds  L- 10  seconds        Transfers: Independent  Gait: Unremarkable     Lumbar ROM (* indicates pain)  Flex:  75 % full range  Ext:  75  % full range*  Sidebending: R-  80 % full range*  L-  80 % full range*  Rotation: R- 75  % full range L-  75 % full range*                   Supine Hip PROM (* indicates pain)  Flexion: L 130  degrees ; R  130  degrees  IR: L 45  degrees; R 45   degrees  ER: L 45  degrees; R:  45 degrees     Hip MMT and Muscular Performance (* indicates pain)  Flex: L  5/5 ; R  5/5  ABD: L   5/5; R   5/5, concordant lateral hip pain  IR: L- 5/5 R- 4+/5  ER: L- 5/5 R- 5/5  Hip Bridge: 100% full hip extension  Treatments:   Hooklying UE/LE counter rotation rhythmic isometric 2x8-3\" holds  Sidelying clamshells RTB 2x10 bilaterally  Seated Pallof Press 2x10 bilaterally YTB  Multi-directional ball roll outs 2x3 each direction  Standing " Repeated Extension in standing to tolerance 2x8  Reassessment of objective measures, discussion of HEP and plan moving forward 15 minutes  Assessment:   The patient has displayed improvement in bilateral hip flexion, IR and abduction strength, improved right hip flexion, IR and ER PROM, improved right single leg balance capacity, improved lower extremity posterior chain strength as assess via the hip bridge test, mild improvement in lumbar spine flexion and extension ROM . The patient continues to display the following physical and functional limitations: mild limitations with lumbar spine flexion, extension, sidebending and rotation ROM, mild right hip IR muscular weakness and mild limitations with single leg balance capacity bilaterally. The patient would continue to benefit from skilled intervention to address the above mentioned impairments to improve functional capacity and QOL. The patient appeared to understand all education given and displayed verbal agreement with therapy plan of care.      Plan:   Await follow up with presiding physician, patient to call and schedule more PT if physician recommends    OP EDUCATION:   Continue with prescribed HEP      Goals:  Pt will report at least 75% improvement in  pain during everyday activities. PROGRESSING  Pt will demo full and symmetrical AROM of lumbar spine without pain.  Pt will improve Oswestry by at least 10 points (MCID) to reflect improvement in ADLs/pain reduction.   Pt will demonstrate independence and report compliance with HEP. MET  Pt. Will report return to walking for greater than 30 minutes with minimal pain/limitation, indicating improved desired functional capacity PROGRESSING  Patient will display 5/5 strength with hip IR, flexion and abduction PROGRESSING  Patient will display ability to complete hip bridge to full hip extension, indicating improved functional lower extremity posterior chain strength MET

## 2024-12-10 ENCOUNTER — OFFICE VISIT (OUTPATIENT)
Dept: ORTHOPEDIC SURGERY | Facility: CLINIC | Age: 69
End: 2024-12-10
Payer: MEDICARE

## 2024-12-10 DIAGNOSIS — M53.3 SI (SACROILIAC) JOINT DYSFUNCTION: Primary | ICD-10-CM

## 2024-12-10 PROCEDURE — 1123F ACP DISCUSS/DSCN MKR DOCD: CPT | Performed by: ORTHOPAEDIC SURGERY

## 2024-12-10 PROCEDURE — 1160F RVW MEDS BY RX/DR IN RCRD: CPT | Performed by: ORTHOPAEDIC SURGERY

## 2024-12-10 PROCEDURE — 99213 OFFICE O/P EST LOW 20 MIN: CPT | Performed by: ORTHOPAEDIC SURGERY

## 2024-12-10 PROCEDURE — 1159F MED LIST DOCD IN RCRD: CPT | Performed by: ORTHOPAEDIC SURGERY

## 2024-12-10 PROCEDURE — 1036F TOBACCO NON-USER: CPT | Performed by: ORTHOPAEDIC SURGERY

## 2024-12-10 NOTE — PROGRESS NOTES
"  Subjective    Patient ID: Rodolfo    Chief Complaint:   Chief Complaint   Patient presents with    Right Hip - Follow-up     Strain, sprain     History of present illness    69-year-old gentleman presented clinic today for follow-up evaluation right hip strain sprain and SI joint dysfunction.  He is also been dealing with a compression fracture of his spine.  He has recently gotten clearance from his spine specialist that he was seeing.  He has been continuing with outpatient physical therapy once has seemed to do him very well.  He is here today for a check to make sure that he is \"on track\".  Ambulating without assistance.  Minimal pain.      Past medical , Surgical, Family and social history reviewed.      Physical exam  General: No acute distress and breathing comfortably.  Patient is pleasant and cooperative with the examination.    Extremity  Right hip is neurovascular intact.  Good range of motion.  No  instability.  Good strength right lower extremity    Diagnostics  [ none]  No results found.     Procedure  [ none]    Assessment  Right hip/SI joint dysfunction    Treatment plan  1.  He was encouraged to continue with weightbearing activity as tolerated  2.  New prescription was put in chart for pelvic stability core strength we had a long discussion in regards to continued balance and stability  3.  He will follow-up with us if he needs anything in the future.  For complete plan and/or surgical details, please refer to Dr. Aguilar's portion of the split/shared dictation.  4.  All of the patient's questions were answered.    No orders of the defined types were placed in this encounter.      This note was prepared using voice recognition software.  The details of this note are correct and have been reviewed, and corrected to the best of my ability.  Some grammatical areas may persist related to the Dragon software    Nithin Maguire PA-C, [facility]  [facility] [place]    [phone]    In " a face-to-face encounter performed today, I Los Aguilar MD performed a history and physical examination, discussed pertinent diagnostic studies if indicated, and discussed diagnosis and management strategies with both the patient and the midlevel provider.  I reviewed the midlevel's note and agree with the documented findings and plan of care.  Greater than 50% of the evaluation and treatment decision was performed by the physician myself during today's visit.    69-year-old gentleman here for follow-up of his back he states that physical therapy seems to be helping his symptoms seem to be improving he is not 100% better but noticing more more improvement he is ambulating without assistive ice on examination he has good range of motion of both hips no pain with internal ex rotation neurologically intact distally brisk cap refill.  Impression is healing strains mainly hip.  Plan is continue with physical therapy new prescription provided today continue work on range of motion strengthening follow-up with me if he has increased pain or discomfort otherwise as needed.      Patient has severe impairment related to her presenting condition.  It is significantly impairing bodily function.  We did discuss surgical and nonsurgical options.    This note was prepared using voice recognition software.  The details of this note are correct and have been reviewed, and corrected to the best of my ability.  Some grammatical areas may persist related to the Dragon software    Los Aguilar MD  Senior Attending Physician  [facility]    [phone]

## 2024-12-12 ENCOUNTER — APPOINTMENT (OUTPATIENT)
Dept: PHYSICAL THERAPY | Facility: CLINIC | Age: 69
End: 2024-12-12
Payer: MEDICARE

## 2024-12-18 ENCOUNTER — TREATMENT (OUTPATIENT)
Dept: PHYSICAL THERAPY | Facility: CLINIC | Age: 69
End: 2024-12-18
Payer: MEDICARE

## 2024-12-18 DIAGNOSIS — M25.551 RIGHT HIP PAIN: ICD-10-CM

## 2024-12-18 DIAGNOSIS — S32.010D CLOSED WEDGE COMPRESSION FRACTURE OF L1 VERTEBRA WITH ROUTINE HEALING: ICD-10-CM

## 2024-12-18 PROCEDURE — 97110 THERAPEUTIC EXERCISES: CPT | Mod: GP | Performed by: PHYSICAL THERAPIST

## 2024-12-18 NOTE — PROGRESS NOTES
"Physical Therapy Treatment    Patient Name: Rodolfo Wellington  MRN: [id number]  Today's Date: 12/18/2024  Time Calculation  Start Time: 1535  Stop Time: 1615  Time Calculation (min): 40 min  PT Therapeutic Procedures Time Entry  Manual Therapy Time Entry: 5  Therapeutic Exercise Time Entry: 35       Insurance:  Visit number: 11/12  Approved number of visits: BMN       MEDICARE/AARP 2230 ($0 USED ) COPAY 0 DED 0   COVERAGE 80/100 OOP 0 AARP TO FOLLOW MEDICARE   NO AUTH REQ [id number]/ALL     Current Problem  1. Closed wedge compression fracture of L1 vertebra with routine healing  Follow Up In Physical Therapy      2. Right hip pain  Follow Up In Physical Therapy              Subjective   General   Pt. Reports that he followed up with the physician who initially evaluated him for his previous complaint of right lateral hip pain. He spoke with his presiding physician's assistant discussed his progress with him and advised him to continue to progress with therapy in relation to his initial injury of his back.     He reports that he is able to sit for longer periods of time and in different chairs. He reports he has a baseline level discomfort. When he is distracted, sometimes he won't feel the discomfort. He will notice some fatigue discomfort towards the end of the day. He reports that he rode in a car for about 3 hours the other day. He felt OK following this.   Precautions     Pain    5/10    Objective     Treatments:   Hooklying UE/LE counter rotation rhythmic isometric 2x8-3\" holds  Hooklying marching 2x10  Hooklying straight arm pulldowns YTB 2x8  Seated Pallof Press 2x10 bilaterally RTB  Seated trunk flexion, multi-directions 2x 4x each  Standing Repeated Extension in standing to tolerance 2x8  Education regarding rationale for plan of care, treatment approach 5 minutes    MANUAL 5 minutes  STM through thoracolumbar paraspinal musculature  Assessment:   Patient tolerated treatment well. Patient able to tolerate " progression of lumbopelvic stability training. Patient was fatigued with Patient appeared to understand all education provided. Patient tolerated progression of repeated lumbar spine flexion and extension based movements well. Overall, he is tolerating progression of gradual lumbar spine ROM training and gradual progressions of lumbopelvic loading. Is displaying limitations with AROM lumbar spine extension, but patient does not report discomfort with this. Will continue to benefit from skilled intervention to address the above mentioned impairments and limitations. No adverse reactions were observed or reported from patient at the conclusion of today's session.      Plan:   Progress lumbopelvic stability and ROM training    OP EDUCATION:   Access Code: [email or web address]  URL: [email or web address]  Date: 12/18/2024  Prepared by: Jaiden Raphael    Exercises  - Supine March  - 1 x daily - 7 x weekly - 2 sets - 10 reps  - Seated Flexion Stretch  - 1 x daily - 7 x weekly - 2 sets - 8-10 reps  - Standing Single Leg Stance with Unilateral Counter Support  - 1 x daily - 7 x weekly - 10 reps - 10 second hold  - Supine Bridge  - 1 x daily - 7 x weekly - 2 sets - 10 reps  - Standing Lumbar Extension  - 1 x daily - 7 x weekly - 2 sets - 10 reps

## 2024-12-30 NOTE — PROGRESS NOTES
"Physical Therapy Treatment    Patient Name: Rodolfo Wellington  MRN: [id number]  Today's Date: 12/31/2024  Time Calculation  Start Time: 0249  Stop Time: 0332  Time Calculation (min): 43 min     PT Therapeutic Procedures Time Entry  Manual Therapy Time Entry: 5  Therapeutic Exercise Time Entry: 38                 Current Problem  1. Closed wedge compression fracture of L1 vertebra with routine healing  Follow Up In Physical Therapy      2. Right hip pain  Follow Up In Physical Therapy          Insurance:  Visit number: 13  Approved number of visits: BMN       MEDICARE/AARP 2230 ($0 USED ) COPAY 0 DED 0   COVERAGE 80/100 OOP 0 AARP TO FOLLOW MEDICARE   NO AUTH REQ [id number]/ALL      Precautions   none    Subjective   Subjective:   Pt reports his pain is just in the back muscles now. He feels that he is slowly getting stronger, but still gets pain with increased activities. Twisting motions still tend to irritate pt  Pain   3/10    Objective   Treatments:   Hooklying UE/LE counter rotation rhythmic isometric 2x8-3\" holds  Hooklying marching 2x10   Supine SLR with TA bracing B 10x ea  Hooklying heelwalkouts 6x  Hooklying PB push into thighs 3\"x15  Hooklying straight arm pulldowns YTB 2x10  Seated Pallof Press 2x10 bilaterally RTB  Seated trunk flexion, multi-directions 2x 4x each---  Standing Repeated Extension in standing to tolerance 2x8---       MANUAL 5 minutes  STM through thoracolumbar paraspinal musculature  OP EDUCATION:         Assessment:   Reminders given with core exercises for TA bracing. Introduced more core stabilization ex with focus on abdominal recruitment and breathing. Pt did well with these. Pt fatigue during pallof press. Pt had some palpable tenderness along lower R parspinals. Good overall tolerance to therapy session.    Plan:   Progress lumbopelvic stability and ROM training               "

## 2024-12-31 ENCOUNTER — TREATMENT (OUTPATIENT)
Dept: PHYSICAL THERAPY | Facility: CLINIC | Age: 69
End: 2024-12-31
Payer: MEDICARE

## 2024-12-31 DIAGNOSIS — S32.010D CLOSED WEDGE COMPRESSION FRACTURE OF L1 VERTEBRA WITH ROUTINE HEALING: Primary | ICD-10-CM

## 2024-12-31 DIAGNOSIS — M25.551 RIGHT HIP PAIN: ICD-10-CM

## 2024-12-31 PROCEDURE — 97110 THERAPEUTIC EXERCISES: CPT | Mod: GP,CQ

## 2025-01-09 ENCOUNTER — TREATMENT (OUTPATIENT)
Dept: PHYSICAL THERAPY | Facility: CLINIC | Age: 70
End: 2025-01-09
Payer: MEDICARE

## 2025-01-09 DIAGNOSIS — S32.010D CLOSED WEDGE COMPRESSION FRACTURE OF L1 VERTEBRA WITH ROUTINE HEALING: ICD-10-CM

## 2025-01-09 DIAGNOSIS — M25.551 RIGHT HIP PAIN: ICD-10-CM

## 2025-01-09 PROCEDURE — 97140 MANUAL THERAPY 1/> REGIONS: CPT | Mod: GP | Performed by: PHYSICAL THERAPIST

## 2025-01-09 PROCEDURE — 97110 THERAPEUTIC EXERCISES: CPT | Mod: GP | Performed by: PHYSICAL THERAPIST

## 2025-01-09 NOTE — PROGRESS NOTES
"Physical Therapy Treatment    Patient Name: Rodolfo Wellington  MRN: [id number]  Today's Date: 1/9/2025  Time Calculation  Start Time: 1337  Stop Time: 1415  Time Calculation (min): 38 min     PT Therapeutic Procedures Time Entry  Manual Therapy Time Entry: 5  Therapeutic Exercise Time Entry: 33                 Current Problem  1. Closed wedge compression fracture of L1 vertebra with routine healing  Follow Up In Physical Therapy      2. Right hip pain  Follow Up In Physical Therapy            Insurance:  Visit number: 14  Approved number of visits: BMN       MEDICARE/AARP 2230 ($0 USED ) COPAY 0 DED 0   COVERAGE 80/100 OOP 0 AARP TO FOLLOW MEDICARE   NO AUTH REQ [id number]/ALL      Precautions   none    Subjective   Subjective:   Pt reports that he had a really good day yesterday and did a lot of painting. He was tired after this and noticed a little soreness through his lower back today but this felt better after doing his exercises. He has been practicing his HEP.   Pain   3/10    Objective   Lumbar Spine AROM  Flexion: 80% full range  Extension: 50% full range  Rotation: 80% full range bilaterally  Treatments:   Hooklying UE/LE counter rotation rhythmic isometric 2x8-3\" holds  Thoracic Open Books 2x8-10 bilaterally  Hooklying Posterior Pelvic tilts 1x10 tactile cueing   Hooklying posterior pelvic tilt with hip abduction iso hold 1x10-3\" holds  QP Cat Cow 2x8-10  Seated Thoracic Rotation repeated bilaterally 1x10 bilaterally  Standing Pallof Press 2x10 bilaterally RTB  Standing Straight arm Pull down RTB 2x10          MANUAL 5 minutes  STM through thoracolumbar paraspinal musculature  OP EDUCATION:  Access Code: [email or web address]  URL: [email or web address]  Date: 01/09/2025  Prepared by: Jaiden Raphael    Exercises  - Seated Trunk Rotation  - 1 x daily - 7 x weekly - 2 sets - 10 reps  - Sidelying Thoracic Rotation with Open Book  - 1 x daily - 7 x weekly - 2 sets - 10 reps      Assessment:   Patient tolerated " treatment well. Patient is displaying improved quality and quantity of AROM lumbar spine flexion, extension and bilateral rotation AROM. Patient continues to be limited with lumbar spine extension ROM. Patient also displaying minor limitations with bilateral thoracolumbar rotation AROM. Patient was challenged with progression of thoracolumbar and lumbopelvic dissociation based exercises but this improved with repetition and cueing.  Patient appeared to understand all education provided. Will continue to benefit from skilled intervention to address the above mentioned impairments and limitations. No adverse reactions were observed or reported from patient at the conclusion of today's session.        Plan:   Progression capacity for lumbopelvic dissociation

## 2025-01-14 ENCOUNTER — TREATMENT (OUTPATIENT)
Dept: PHYSICAL THERAPY | Facility: CLINIC | Age: 70
End: 2025-01-14
Payer: MEDICARE

## 2025-01-14 DIAGNOSIS — M25.551 RIGHT HIP PAIN: ICD-10-CM

## 2025-01-14 DIAGNOSIS — S32.010D CLOSED WEDGE COMPRESSION FRACTURE OF L1 VERTEBRA WITH ROUTINE HEALING: ICD-10-CM

## 2025-01-14 PROCEDURE — 97110 THERAPEUTIC EXERCISES: CPT | Mod: GP | Performed by: PHYSICAL THERAPIST

## 2025-01-14 NOTE — PROGRESS NOTES
"Physical Therapy Treatment    Patient Name: Rodolfo Wellington  MRN: [id number]  Today's Date: 1/14/2025  Time Calculation  Start Time: 1401  Stop Time: 1441  Time Calculation (min): 40 min     PT Therapeutic Procedures Time Entry  Manual Therapy Time Entry: 5  Therapeutic Exercise Time Entry: 35                 Current Problem  1. Closed wedge compression fracture of L1 vertebra with routine healing  Follow Up In Physical Therapy      2. Right hip pain  Follow Up In Physical Therapy              Insurance:  Visit number: 15  Approved number of visits: BMN       MEDICARE/AARP 2230 ($0 USED ) COPAY 0 DED 0   COVERAGE 80/100 OOP 0 AARP TO FOLLOW MEDICARE   NO AUTH REQ [id number]      Precautions   none    Subjective   Subjective:   Pt reports that he has been doing a lot of painting. He has noticed some lower back soreness with this, but it is at a manageable level.He reports that he has been feeling pretty go this morning. He reports that his HEP has been going well.   Pain   3/10    Objective   Lumbar Spine AROM  Flexion: 85% full range  Extension: 60% full range  Rotation:  80% full range bilaterally  Treatments:   Hooklying UE/LE counter rotation rhythmic isometric 2x8-3\" holds  Thoracic Open Books 2x8-10 bilaterally  Hooklying Posterior Pelvic tilts 1x10 tactile cueing   Hooklying posterior pelvic tilt with hip abduction iso hold 1x10-3\" holds  QP Cat Cow 2x8-10  Seated Thoracic Rotation repeated bilaterally 1x10 bilaterally  Standing Pallof Press 2x10 bilaterally RTB  Standing Straight arm Pull down RTB 2x10       MANUAL 5 minutes  STM through thoracolumbar paraspinal musculature    Hooklying UE Dead bug 2x8-10 bilaterally  Hooklying hip abduction iso with horziontal shoulder extension RTB, GTB 2x8  Seated trunk rhythmic isometrics 3x30\"  Standing diagonal trunk reaches 2x8 each direction  Resisted standing trunk rotation YTB bilaterally 2x8  Standing D2 flexion bilateral against foam roll 2x10    OP " EDUCATION:  Access Code: [email or web address]  URL: [email or web address]  Date: 01/09/2025  Prepared by: Jaiden Raphael    Exercises  - Seated Trunk Rotation  - 1 x daily - 7 x weekly - 2 sets - 10 reps  - Sidelying Thoracic Rotation with Open Book  - 1 x daily - 7 x weekly - 2 sets - 10 reps      Assessment:   Patient tolerated treatment well. Patient is displaying improved quality and quantity of lumbar spine flexion, extension and bilateral rotation ROM. Patient continues to be limited with lumbar spine extension but this has improved. Patient tolerated progression of thoracolumbar extension based movements today. Patient tolerated progression of resisted trunk rotation and isometric based exercises loading the lumbar spine extensor musculature well. Patient was also able to tolerate progression of repeated flexion based movements in standing well.  Patient appeared to understand all education provided. Will continue to benefit from skilled intervention to address the above mentioned impairments and limitations. No adverse reactions were observed or reported from patient at the conclusion of today's session.        Plan:   Progress tolerance to loaded trunk motions and repeated trunk motions

## 2025-01-16 ENCOUNTER — APPOINTMENT (OUTPATIENT)
Dept: PHYSICAL THERAPY | Facility: CLINIC | Age: 70
End: 2025-01-16
Payer: MEDICARE

## 2025-01-23 ENCOUNTER — TREATMENT (OUTPATIENT)
Dept: PHYSICAL THERAPY | Facility: CLINIC | Age: 70
End: 2025-01-23
Payer: MEDICARE

## 2025-01-23 DIAGNOSIS — M25.551 RIGHT HIP PAIN: ICD-10-CM

## 2025-01-23 DIAGNOSIS — S32.010D CLOSED WEDGE COMPRESSION FRACTURE OF L1 VERTEBRA WITH ROUTINE HEALING: ICD-10-CM

## 2025-01-23 PROCEDURE — 97530 THERAPEUTIC ACTIVITIES: CPT | Mod: GP | Performed by: PHYSICAL THERAPIST

## 2025-01-23 PROCEDURE — 97140 MANUAL THERAPY 1/> REGIONS: CPT | Mod: GP | Performed by: PHYSICAL THERAPIST

## 2025-01-23 PROCEDURE — 97110 THERAPEUTIC EXERCISES: CPT | Mod: GP | Performed by: PHYSICAL THERAPIST

## 2025-01-23 NOTE — PROGRESS NOTES
Physical Therapy Treatment    Patient Name: Rodolfo Wellington  MRN: [id number]  Today's Date: 1/23/2025  Time Calculation  Start Time: 1418  Stop Time: 1505  Time Calculation (min): 47 min     PT Therapeutic Procedures Time Entry  Manual Therapy Time Entry: 8  Therapeutic Exercise Time Entry: 20  Therapeutic Activity Time Entry: 19                 Current Problem  1. Closed wedge compression fracture of L1 vertebra with routine healing  Follow Up In Physical Therapy      2. Right hip pain  Follow Up In Physical Therapy                Insurance:  Visit number: 15  Approved number of visits: BMN       MEDICARE/AARP 2230 ($0 USED ) COPAY 0 DED 0   COVERAGE 80/100 OOP 0 AARP TO FOLLOW MEDICARE   NO AUTH REQ [id number]/ALL      Precautions   none    Subjective   Subjective:   Pt reports that he is noticing that he is able to stand for longer periods of time with less discomfort into his lower back. He reports that he is finding benefit with hit current HEP. He reports that he continues to notice some stiffness when getting up after lying or sitting.   Pain   3/10    Objective     Treatments:    Prone on Elbows 2x2 minutes with diaphragmatic breathing  Partial Prone Press ups 3x5  Prone Unilateral Hip extension 2x 6 bilaterally  Knee to waist lift 2x6-8 8lb  Resisted posterior walking with handle hold 7.5#, 10#, 2x 5x in and out each  Unilateral carry 5lbs, 8lbs each hand 200 feet each    MANUAL 8 minutes  STM through thoracolumbar paraspinal musculature    Discussion of HEP, HEP item construction 5 minutes    OP EDUCATION:  Access Code: [email or web address]  URL: [email or web address]  Date: 01/23/2025  Prepared by: Jaiden Raphael    Exercises  - Static Prone on Elbows  - 1 x daily - 7 x weekly - 2-3 minutes hold  - Prone Press Up  - 1 x daily - 7 x weekly - 3 sets - 5 reps    Assessment:   Patient tolerated treatment well. Patient tolerated progression of extension based movements well. Patient did report initial  stiffness with prone on elbow and partial prone press ups, but this improved with repetition. Patient appeared to understand all education provided. Patient was challenged with increased focus upon multi-planar resisted trunk movements. Patient tolerated progression of posterior chain loading well. Patient challenged with hip hinging patterning with partial lifting movements, but was able to maintain mostly proper trunk posture and reported mild lower back fatigue with this.  Added in prone on elbows and prone press ups to HEP to continue to improve tolerance to lumbar spine based positions and movements. Patient did report generalized lower back fatigue following today's session. Will continue to benefit from skilled intervention to address the above mentioned impairments and limitations. No adverse reactions were observed or reported from patient at the conclusion of today's session.            Plan:   Progress tolerance to functional pushing, pulling and lifting based movements, RPT next visit

## 2025-01-29 NOTE — PROGRESS NOTES
Physical Therapy Treatment/Recheck    Patient Name: Rodolfo Wellington  MRN: [id number]  Today's Date: 1/30/2025  Time Calculation  Start Time: 1420  Stop Time: 1505  Time Calculation (min): 45 min     PT Therapeutic Procedures Time Entry  Therapeutic Exercise Time Entry: 20  Therapeutic Activity Time Entry: 25                 Current Problem  1. Closed wedge compression fracture of L1 vertebra with routine healing  Follow Up In Physical Therapy      2. Right hip pain  Follow Up In Physical Therapy                  Insurance:  Visit number: 16  Approved number of visits: BMN       MEDICARE/AARP 2230 ($0 USED ) COPAY 0 DED 0   COVERAGE 80/100 OOP 0 AARP TO FOLLOW MEDICARE   NO AUTH REQ [id number]      Precautions   none    Subjective   Subjective:   Pt reports that Monday he was able to do several hours of painting prior to fatiguing. He reports that he will get to a point after prolonged standing work, he will notice generalized soreness and aching in his back. He reports that he has been noticing less soreness in his lower back in the morning upon waking. He has also noticed improved ability to dry his feet after a shower. He also has noted improved ability to sit in chairs with less support or with hard backs. He will still need some cushion in these instances.   Pain   3-4/10 with fatigue    Objective   Observation: patient   Screening:  SL balance: R- 30 seconds  L- 11  seconds        Transfers: Independent  Gait: Unremarkable     Lumbar ROM (* indicates pain)  Flex:  75 % full range  Ext:  75  % full range  Sidebending: R-  80 % full range L-  80 % full range  Rotation: R- 75  % full range L-  75 % full range*               Hip MMT and Muscular Performance (* indicates pain)  Flex: L  5/5 ; R  5/5  ABD: L   5/5; R   5/5  IR: L- 5/5 R- 5/5  ER: L- 5/5 R- 5/5  Hip Bridge: 100% full hip extension  Single leg hip bridge: 75% full hip extension bilaterally, mild contralateral hip drop    Outcome Measure:  AMANDA-  Treatments:    Hip bridge unilateral march 1x5  Single leg hip bridge 1x8 bilaterally  Chop 2x8 bilaterally 10#,   Lift 2x5-8 bilaterally 7.5#  Resisted forward walking 2x 5x in and out bilaterally 10#, 15#  Reassessment of objective measures, discussion of POC moving forward 25 minutes        OP EDUCATION:  Access Code: [email or web address]  URL: [email or web address]  Date: 01/23/2025  Prepared by: Jaiden Raphael    Exercises  - Static Prone on Elbows  - 1 x daily - 7 x weekly - 2-3 minutes hold  - Prone Press Up  - 1 x daily - 7 x weekly - 3 sets - 5 reps    Assessment:   Patient tolerated reassessment and treatment well. The patient has displayed improvement in reduced discomfort with end range lumbar spine extension and bilateral lumbar rotation, improved bilateral hip ER, IR, and abduction strength, improved capacity for functional pushing, pulling, lifting, bending and twisting movements and improved subjective rating of function. The patient continues to display the following physical and functional limitations: Mild limitations with lumbar spine extension and flexion, some subjective difficulty with heavy prolonged standing work or work involving high amounts of bending, twisting and lifting types of activities and subjective difficulty with housework/yardwork regarding prolonged standing and/or walking. The patient would continue to benefit from skilled intervention to address the above mentioned impairments to improve functional capacity and QOL. The patient appeared to understand all education given and displayed verbal agreement with therapy plan of care.              Plan:   1x every other week for 3 visits      Goals:  Pt will report at least 75% improvement in  pain during everyday activities. PROGRESSING 70%  Pt will demo full and symmetrical AROM of lumbar spine without pain.  Pt will improve Oswestry by at least 10 points (MCID) to reflect improvement in ADLs/pain reduction.   Pt  will demonstrate independence and report compliance with HEP. MET  Pt. Will report return to walking for greater than 30 minutes with minimal pain/limitation, indicating improved desired functional capacity PROGRESSING  Patient will display 5/5 strength with hip IR, flexion and abduction MET  Patient will display ability to complete hip bridge to full hip extension, indicating improved functional lower extremity posterior chain strength MET  Patient will report being able to engage in heavy work, such as painting or yard work, with no more than 2/10 discomfort in lower back NEW

## 2025-01-30 ENCOUNTER — TREATMENT (OUTPATIENT)
Dept: PHYSICAL THERAPY | Facility: CLINIC | Age: 70
End: 2025-01-30
Payer: MEDICARE

## 2025-01-30 DIAGNOSIS — S32.010D CLOSED WEDGE COMPRESSION FRACTURE OF L1 VERTEBRA WITH ROUTINE HEALING: ICD-10-CM

## 2025-01-30 DIAGNOSIS — M25.551 RIGHT HIP PAIN: ICD-10-CM

## 2025-01-30 PROCEDURE — 97530 THERAPEUTIC ACTIVITIES: CPT | Mod: GP | Performed by: PHYSICAL THERAPIST

## 2025-01-30 PROCEDURE — 97110 THERAPEUTIC EXERCISES: CPT | Mod: GP | Performed by: PHYSICAL THERAPIST

## 2025-02-12 ENCOUNTER — TREATMENT (OUTPATIENT)
Dept: PHYSICAL THERAPY | Facility: CLINIC | Age: 70
End: 2025-02-12
Payer: MEDICARE

## 2025-02-12 DIAGNOSIS — M25.551 RIGHT HIP PAIN: ICD-10-CM

## 2025-02-12 DIAGNOSIS — S32.010D CLOSED WEDGE COMPRESSION FRACTURE OF L1 VERTEBRA WITH ROUTINE HEALING: ICD-10-CM

## 2025-02-12 PROCEDURE — 97140 MANUAL THERAPY 1/> REGIONS: CPT | Mod: GP | Performed by: PHYSICAL THERAPIST

## 2025-02-12 PROCEDURE — 97530 THERAPEUTIC ACTIVITIES: CPT | Mod: GP | Performed by: PHYSICAL THERAPIST

## 2025-02-12 NOTE — PROGRESS NOTES
Physical Therapy Treatment/Recheck    Patient Name: Rodolfo Wellington  MRN: [id number]  Today's Date: 2/12/2025  Time Calculation  Start Time: 1149  Stop Time: 1228  Time Calculation (min): 39 min     PT Therapeutic Procedures Time Entry  Manual Therapy Time Entry: 5  Therapeutic Activity Time Entry: 33                 Current Problem  1. Closed wedge compression fracture of L1 vertebra with routine healing  Follow Up In Physical Therapy      2. Right hip pain  Follow Up In Physical Therapy                    Insurance:  Visit number: 16  Approved number of visits: BMN       MEDICARE/AARP 2230 ($0 USED ) COPAY 0 DED 0   COVERAGE 80/100 OOP 0 AARP TO FOLLOW MEDICARE   NO AUTH REQ [id number]      Precautions   none    Subjective   Subjective:   Pt reports that he is doing pretty well. He reports that he was able to shovel his entire driveway/sidewalk and was able to do this without stopping. He reports that he was a little sore the next day. Patient reports that he feels like his recovery is slightly better. He still feels some discomfort with repetitive twisting.     Pain   0/10    Objective     Treatments:      Partial deadlift 6 inch box 7lbs, 10lbs, 12lbs  Step up 8 inch 2x6-8 bilaterally  Sled push/Pull 50lbs  Trunk rotation with theraband GTB 1x10    Manual 5 minutes  STM through bilateral lumbosacral paraspinal musculature    Review and refinement of HEP 8 minutes        OP EDUCATION:  Access Code: [email or web address]  URL: [email or web address]  Date: 01/23/2025  Prepared by: Jaiden Raphael    Exercises  - Static Prone on Elbows  - 1 x daily - 7 x weekly - 2-3 minutes hold  - Prone Press Up  - 1 x daily - 7 x weekly - 3 sets - 5 reps  -Trunk rotation with theraband GTB 2x10 each day  Assessment:  Patient tolerated treatment well. Patient tolerated progression of lifting training well. Patient required cueing to minimize excessive trunk flexion and proper base of support. Patient tolerated introduction of  step up training of posterior chain hip strengthening well. Patient also displayed proper form and capacity to complete functional pushing and pulling activities today. Added resisted trunk rotation with TB for HEP today to progress dynamic trunk stability. Patient appeared to understand all education provided. Will continue to benefit from skilled intervention to address the above mentioned impairments and limitations. No adverse reactions were observed or reported from patient at the conclusion of today's session.                  Plan:   Progress resisted multi plane rotational movements

## 2025-02-13 ENCOUNTER — APPOINTMENT (OUTPATIENT)
Dept: PHYSICAL THERAPY | Facility: CLINIC | Age: 70
End: 2025-02-13
Payer: MEDICARE

## 2025-02-27 ENCOUNTER — TREATMENT (OUTPATIENT)
Dept: PHYSICAL THERAPY | Facility: CLINIC | Age: 70
End: 2025-02-27
Payer: MEDICARE

## 2025-02-27 DIAGNOSIS — M25.551 RIGHT HIP PAIN: ICD-10-CM

## 2025-02-27 DIAGNOSIS — S32.010D CLOSED WEDGE COMPRESSION FRACTURE OF L1 VERTEBRA WITH ROUTINE HEALING: ICD-10-CM

## 2025-02-27 PROCEDURE — 97140 MANUAL THERAPY 1/> REGIONS: CPT | Mod: GP | Performed by: PHYSICAL THERAPIST

## 2025-02-27 PROCEDURE — 97110 THERAPEUTIC EXERCISES: CPT | Mod: GP | Performed by: PHYSICAL THERAPIST

## 2025-02-27 NOTE — PROGRESS NOTES
Physical Therapy Treatment    Patient Name: Rodolfo Wellington  MRN: [id number]  Today's Date: 2/27/2025  Time Calculation  Start Time: 1416  Stop Time: 1458  Time Calculation (min): 42 min     PT Therapeutic Procedures Time Entry  Manual Therapy Time Entry: 15  Therapeutic Exercise Time Entry: 22  Therapeutic Activity Time Entry: 5                 Current Problem  1. Closed wedge compression fracture of L1 vertebra with routine healing  Follow Up In Physical Therapy      2. Right hip pain  Follow Up In Physical Therapy                      Insurance:  Visit number: 17  Approved number of visits: BMN       MEDICARE/AARP 2230 ($0 USED ) COPAY 0 DED 0   COVERAGE 80/100 OOP 0 AARP TO FOLLOW MEDICARE   NO AUTH REQ [id number]      Precautions   none    Subjective   Subjective:   Pt reports that last week he was able to shovel with the snow storm last week and this was OK. He would be sore after, but was manageable. Patient reports that yesterday he did a lot of house work. Patient reports he feels like his standing endurance has improved. Patient reports the continues to have a low level baseline tightness along his belt line    Pain   0/10    Objective   Lumbar spine extension AROM: 75% full range, no pain  Treatments:        Manual 15 minutes  STM through bilateral lumbosacral paraspinal musculature  CPA joints mobilizations Grade I T8-L5/S1        Prone hip extension 3x8-10 no weight, 1lb, 2lbs  Standing Donkey kicks 3x8-10 no weight, 2lbs  Standing overhead Proctor Carries 2x down and back, each arm 3lbs, 5lbs, 60 feet down and back  Discussion regarding refinement of HEP, prognosis/recovery timelines 5 minutes      OP EDUCATION:  Access Code: [email or web address]  URL: [email or web address]  Date: 01/23/2025  Prepared by: Jaiden Raphael    Exercises  - Static Prone on Elbows  - 1 x daily - 7 x weekly - 2-3 minutes hold  - Prone Press Up  - 1 x daily - 7 x weekly - 3 sets - 5 reps  -Trunk rotation with theraband  GTB 2x10 each day  Access Code: [email or web address]  URL: [email or web address]  Date: 02/27/2025  Prepared by: Jaiden Raphael    Exercises  - Prone Hip Extension  - 1 x daily - 7 x weekly - 2 sets - 10 reps  Assessment:  Patient tolerated treatment well. Patient is displaying improved quality and quantity of AROM lumbar spine extension. Patient tolerated introduction of Grade I CPA joint mobilizations through thoracolumbar and lumbosacral regions well. Patient also tolerated increased focus on isolated active hip extension without and with resistance. Patient is displaying mild limitations with AROM hip extension and required tactile cueing to minimize compensatory posterior pelvic rotation with prone unilateral hip extension. Added prone unilateral hip extension to HEP for progression of posterior chain strength and lumbar spine extensor strength. Also progress trunk rotation with blue theraband at home. Patient appeared to understand all education provided. Will continue to benefit from skilled intervention to address the above mentioned impairments and limitations. No adverse reactions were observed or reported from patient at the conclusion of today's session.                  Plan:   Reassessment of objective measures, discussion of POC moving forward

## 2025-03-13 ENCOUNTER — TREATMENT (OUTPATIENT)
Dept: PHYSICAL THERAPY | Facility: CLINIC | Age: 70
End: 2025-03-13
Payer: MEDICARE

## 2025-03-13 ENCOUNTER — APPOINTMENT (OUTPATIENT)
Dept: PRIMARY CARE | Facility: CLINIC | Age: 70
End: 2025-03-13
Payer: MEDICARE

## 2025-03-13 VITALS
HEIGHT: 71 IN | TEMPERATURE: 97.3 F | BODY MASS INDEX: 26.63 KG/M2 | WEIGHT: 190.2 LBS | HEART RATE: 74 BPM | DIASTOLIC BLOOD PRESSURE: 78 MMHG | SYSTOLIC BLOOD PRESSURE: 144 MMHG

## 2025-03-13 DIAGNOSIS — E05.90 HYPERTHYROIDISM: ICD-10-CM

## 2025-03-13 DIAGNOSIS — E66.3 OVERWEIGHT WITH BODY MASS INDEX (BMI) OF 26 TO 26.9 IN ADULT: ICD-10-CM

## 2025-03-13 DIAGNOSIS — Z12.5 PROSTATE CANCER SCREENING: ICD-10-CM

## 2025-03-13 DIAGNOSIS — Z78.9 NEVER SMOKED CIGARETTES: ICD-10-CM

## 2025-03-13 DIAGNOSIS — E78.5 HYPERLIPIDEMIA, UNSPECIFIED HYPERLIPIDEMIA TYPE: ICD-10-CM

## 2025-03-13 DIAGNOSIS — I10 PRIMARY HYPERTENSION: ICD-10-CM

## 2025-03-13 DIAGNOSIS — S32.010D CLOSED WEDGE COMPRESSION FRACTURE OF L1 VERTEBRA WITH ROUTINE HEALING: Primary | ICD-10-CM

## 2025-03-13 DIAGNOSIS — M25.551 RIGHT HIP PAIN: ICD-10-CM

## 2025-03-13 PROCEDURE — 3078F DIAST BP <80 MM HG: CPT | Performed by: FAMILY MEDICINE

## 2025-03-13 PROCEDURE — 1159F MED LIST DOCD IN RCRD: CPT | Performed by: FAMILY MEDICINE

## 2025-03-13 PROCEDURE — 97530 THERAPEUTIC ACTIVITIES: CPT | Mod: GP | Performed by: PHYSICAL THERAPIST

## 2025-03-13 PROCEDURE — 3008F BODY MASS INDEX DOCD: CPT | Performed by: FAMILY MEDICINE

## 2025-03-13 PROCEDURE — 97110 THERAPEUTIC EXERCISES: CPT | Mod: GP | Performed by: PHYSICAL THERAPIST

## 2025-03-13 PROCEDURE — 1036F TOBACCO NON-USER: CPT | Performed by: FAMILY MEDICINE

## 2025-03-13 PROCEDURE — 1160F RVW MEDS BY RX/DR IN RCRD: CPT | Performed by: FAMILY MEDICINE

## 2025-03-13 PROCEDURE — 99213 OFFICE O/P EST LOW 20 MIN: CPT | Performed by: FAMILY MEDICINE

## 2025-03-13 PROCEDURE — 3077F SYST BP >= 140 MM HG: CPT | Performed by: FAMILY MEDICINE

## 2025-03-13 PROCEDURE — 1123F ACP DISCUSS/DSCN MKR DOCD: CPT | Performed by: FAMILY MEDICINE

## 2025-03-13 RX ORDER — LOSARTAN POTASSIUM 100 MG/1
100 TABLET ORAL DAILY
Qty: 90 TABLET | Refills: 3 | Status: SHIPPED | OUTPATIENT
Start: 2025-03-13

## 2025-03-13 RX ORDER — ATENOLOL 50 MG/1
75 TABLET ORAL DAILY
Qty: 135 TABLET | Refills: 3 | Status: SHIPPED | OUTPATIENT
Start: 2025-03-13

## 2025-03-13 RX ORDER — AMLODIPINE BESYLATE 5 MG/1
5 TABLET ORAL 2 TIMES DAILY
Qty: 180 TABLET | Refills: 3 | Status: SHIPPED | OUTPATIENT
Start: 2025-03-13

## 2025-03-13 RX ORDER — POTASSIUM CHLORIDE 750 MG/1
10 TABLET, EXTENDED RELEASE ORAL DAILY
Qty: 90 TABLET | Refills: 3 | Status: SHIPPED | OUTPATIENT
Start: 2025-03-13

## 2025-03-13 RX ORDER — SIMVASTATIN 20 MG/1
20 TABLET, FILM COATED ORAL NIGHTLY
Qty: 90 TABLET | Refills: 3 | Status: SHIPPED | OUTPATIENT
Start: 2025-03-13

## 2025-03-13 ASSESSMENT — PATIENT HEALTH QUESTIONNAIRE - PHQ9
SUM OF ALL RESPONSES TO PHQ9 QUESTIONS 1 AND 2: 0
1. LITTLE INTEREST OR PLEASURE IN DOING THINGS: NOT AT ALL
2. FEELING DOWN, DEPRESSED OR HOPELESS: NOT AT ALL

## 2025-03-13 NOTE — PROGRESS NOTES
Valente Reynolds is here for a follow-up on his hypertension and hyperlipidemia.  He states that he is been feeling well.  His L1 compression fracture that he sustained from a serious fall in September 2024 is improving.  He has been undergoing physical therapy which has resulted in him feeling much better from a pain standpoint and also becoming much more mobile.  He is gradually increasing his activities.  He has no other complaints.  He continues on all his meds as noted.  His home blood pressures have been all normal with the exception of 1 slightly elevated reading of 142/76.    Patient ID: Rodolfo Wellington is a 69 y.o. male who presents for Follow-up:    Problem List Items Addressed This Visit    None     Past Medical History:   Diagnosis Date    Body mass index (BMI) 26.0-26.9, adult     BMI 26.0-26.9,adult    COVID-19 09/02/2022    COVID-19    Other specified counseling 08/16/2022    Counseled about COVID-19 virus infection    Overweight     Overweight      Past Surgical History:   Procedure Laterality Date    OTHER SURGICAL HISTORY  01/26/2022    Throat surgery    OTHER SURGICAL HISTORY  01/26/2022    Finger surgical procedure    OTHER SURGICAL HISTORY  02/15/2022    Colonoscopy      Family History   Problem Relation Name Age of Onset    Heart disease Mother      Dementia Father      Cancer Brother        Social History     Socioeconomic History    Marital status:      Spouse name: Not on file    Number of children: Not on file    Years of education: Not on file    Highest education level: Not on file   Occupational History    Not on file   Tobacco Use    Smoking status: Never    Smokeless tobacco: Never   Substance and Sexual Activity    Alcohol use: Yes     Alcohol/week: 4.0 standard drinks of alcohol     Types: 4 Standard drinks or equivalent per week    Drug use: Not Currently    Sexual activity: Not on file   Other Topics Concern    Not on file   Social History Narrative    Not on file     Social  Drivers of Health     Financial Resource Strain: Not on file   Food Insecurity: Not on file   Transportation Needs: Not on file   Physical Activity: Not on file   Stress: Not on file   Social Connections: Not on file   Intimate Partner Violence: Not on file   Housing Stability: Not on file      Oxycodone-acetaminophen   Current Outpatient Medications   Medication Sig Dispense Refill    amLODIPine (Norvasc) 5 mg tablet Take 1 tablet (5 mg) by mouth 2 times a day. 180 tablet 1    aspirin 81 mg EC tablet Take 1 tablet (81 mg) by mouth once daily.      atenolol (Tenormin) 50 mg tablet Take 1.5 tablets (75 mg) by mouth once daily. 135 tablet 1    Klor-Con M10 10 mEq ER tablet Take 1 tablet (10 mEq) by mouth once daily. 90 tablet 1    losartan (Cozaar) 100 mg tablet Take 1 tablet (100 mg) by mouth once daily. 90 tablet 1    PreviDent 5000 Sensitive 1.1-5 % paste USE ONCE DAILY AT BEDTIME      simvastatin (Zocor) 20 mg tablet Take 1 tablet (20 mg) by mouth once daily at bedtime. 90 tablet 1     No current facility-administered medications for this visit.       Immunization History   Administered Date(s) Administered    COVID-19, mRNA, LNP-S, PF, 30 mcg/0.3 mL dose 10/21/2021    Flu vaccine (IIV4), preservative free *Check age/dose* 11/01/2018    Flu vaccine, quadrivalent, high-dose, preservative free, age 65y+ (FLUZONE) 09/25/2020    Flu vaccine, quadrivalent, no egg protein, age 6 month or greater (FLUCELVAX) 12/04/2017    Flu vaccine, trivalent, preservative free, HIGH-DOSE, age 65y+ (Fluzone) 09/29/2018, 09/25/2020    Flu vaccine, trivalent, preservative free, age 6 months and greater (Fluarix/Fluzone/Flulaval) 11/03/2015    Influenza, Seasonal, Quadrivalent, Adjuvanted 10/29/2021, 10/05/2022, 10/02/2023    Influenza, Unspecified 11/01/2012, 11/01/2014, 11/01/2015, 12/05/2016, 10/01/2021    Influenza, seasonal, injectable 10/01/2020    Influenza, trivalent, adjuvanted 10/01/2024    Novel influenza-H1N1-09,  preservative-free 01/04/2010    Pfizer COVID-19 vaccine, 12 years and older, (30mcg/0.3mL) (Comirnaty) 11/16/2023, 10/01/2024    Pfizer COVID-19 vaccine, bivalent, age 12 years and older (30 mcg/0.3 mL) 10/20/2022    Pfizer Gray Cap SARS-CoV-2 04/21/2022    Pfizer Purple Cap SARS-CoV-2 03/11/2021, 04/08/2021, 10/22/2021, 04/22/2022    Pneumococcal conjugate vaccine, 13-valent (PREVNAR 13) 07/03/2018    Pneumococcal polysaccharide vaccine, 23-valent, age 2 years and older (PNEUMOVAX 23) 07/12/2019    Tdap vaccine, age 7 year and older (BOOSTRIX, ADACEL) 11/22/2023    Zoster vaccine, recombinant, adult (SHINGRIX) 08/29/2021, 12/03/2021    Zoster, live 02/05/2016        Review of Systems   Constitutional: Negative.    HENT: Negative.     Eyes: Negative.    Respiratory: Negative.     Cardiovascular: Negative.    Gastrointestinal: Negative.    Endocrine: Negative.    Genitourinary: Negative.    Musculoskeletal: Negative.    Skin: Negative.    Allergic/Immunologic: Negative.    Hematological: Negative.    Psychiatric/Behavioral: Negative.     All other systems reviewed and are negative.       Vitals:    03/13/25 1139   BP: 144/78   Pulse: 74   Temp: 36.3 °C (97.3 °F)     Vitals:    03/13/25 1139   Weight: 86.3 kg (190 lb 3.2 oz)      Physical Exam  Constitutional:       General: He is not in acute distress.     Appearance: Normal appearance.   Cardiovascular:      Rate and Rhythm: Normal rate and regular rhythm.      Pulses: Normal pulses.      Heart sounds: Normal heart sounds. No murmur heard.     No friction rub. No gallop.   Pulmonary:      Effort: Pulmonary effort is normal. No respiratory distress.      Breath sounds: Normal breath sounds. No wheezing or rales.   Neurological:      Mental Status: He is alert.     Heart-regular S1-S2 with occasional ectopy.    ASSESSMENT/PLAN: Hyper tension with whitecoat component.  Continue home blood pressure checks.  Continue current medications as noted.    Hyperlipidemia.   Continue simvastatin daily.  Check lipid profile in August 2025    Status post compression fracture of L1 healing and improving    Colonoscopy up-to-date  Immunizations up-to-date  Also check CBC CMP and PSA in August 2025  Follow-up 6 months and call as needed     Scribe Attestation  By signing my name below, I, Bee Harding LPN, Scribe   attest that this documentation has been prepared under the direction and in the presence of Yfn Gibbs MD.

## 2025-03-13 NOTE — PROGRESS NOTES
Physical Therapy Treatment/Recheck    Patient Name: Rodolfo Wellington  MRN: [id number]  Today's Date: 3/13/2025  Time Calculation  Start Time: 1418  Stop Time: 1459  Time Calculation (min): 41 min     PT Therapeutic Procedures Time Entry  Therapeutic Exercise Time Entry: 11  Therapeutic Activity Time Entry: 30                 Current Problem  1. Closed wedge compression fracture of L1 vertebra with routine healing        2. Right hip pain                          Insurance:  Visit number: 17  Approved number of visits: BMN       MEDICARE/AARP 2230 ($0 USED ) COPAY 0 DED 0   COVERAGE 80/100 OOP 0 AARP TO FOLLOW MEDICARE   NO AUTH REQ [id number]      Precautions   none    Subjective   Subjective:   Pt reports that generally he has less pain. It is less intense. He is having longer periods of minimal discomfort. He reports about a week ago, there were 2-3 days where he was not overly active and he felt very good. He has been more active the last few days and he continues to feel like he recovers quicker. Patient does report that when he sits for prolonged periods of time in a chair without a back, he will feel aching in the front of his hips and lower abdomen. Patient also reports that when he is active for a while, then lays down and then gets back up, he will initially feel stiffness in his lower back that resolves in less than 1 minute with movement.   Pain   0/10 at best, at worst 3/10    Objective   Observation: Unremarkable  Screening:  SL balance: R- 30 seconds  L- 30  seconds        Transfers: Independent  Gait: Unremarkable     Lumbar ROM (* indicates pain)  Flex:  75 % full range  Ext:  75  % full range  Sidebending: R-  90 % full range L-  90 % full range  Rotation: R- 90  % full range L-  90 % full range                 Hip MMT and Muscular Performance (* indicates pain)  Flex: L  5/5 ; R  5/5  ABD: L   5/5; R   5/5  IR: L- 5/5 R- 5/5  ER: L- 5-/5 R- 5-/5  Hip Bridge: 100% full hip extension  Single leg hip  bridge: 90 full hip extension bilaterally, mild contralateral hip drop     Outcome Measure: AMANDA- 8  Treatments:      Reassessment of objective measures, discussion of POC moving forward, discussion regarding current status of symptoms and progression of functional status, discussion/modification of current HEP 30 minutes    Standing [place]umb[place] diagonal lift 1x5 bilateral, 5lbs  Standing Chop FM 10#, 1x10 bilateral  Standing Bilateral FM Row 10#  Standing bilateral lumbar sidebending, 1x10    OP EDUCATION:  Access Code: [email or web address]  URL: [email or web address]  Date: 01/23/2025  Prepared by: Jaiden Raphael    Exercises  - Static Prone on Elbows  - 1 x daily - 7 x weekly - 2-3 minutes hold  - Prone Press Up  - 1 x daily - 7 x weekly - 3 sets - 5 reps  -Trunk rotation with theraband GTB 2x10 each day  Access Code: [email or web address]  URL: [email or web address]  Date: 02/27/2025  Prepared by: Jaiden Chelette    Exercises  - Prone Hip Extension  - 1 x daily - 7 x weekly - 2 sets - 10 reps    Access Code: [email or web address]  URL: [email or web address]  Date: 03/13/2025  Prepared by: Jaiden Chelette    Exercises  - Standing Sidebends  - 1 x daily - 7 x weekly - 1 sets - 10 reps  - Reverse Chop with Elbows Bent  - 1 x daily - 7 x weekly - 2 sets - 5 reps  Assessment:  Patient tolerated reassessment and treatment well. Overall, patient is reporting subjective improvement in tolerance to prolonged standing and walking based activities, improved tolerance to heavy ADLs, such as yard work and repetitive lifting activities, reduced intensity and duration of post-activity lower back soreness following these activities and generalized reduction in concordant lower back stiffness/soreness as baseline.  The patient would continue to benefit from skilled intervention to address the above mentioned impairments to improve functional capacity and QOL. The patient appeared to understand all education  given and displayed verbal agreement with therapy plan of care.          Goals:  Pt will report at least 75% improvement in  pain during everyday activities. ALMOST MET 70-80%  Pt will demo full and symmetrical AROM of lumbar spine without pain.  Pt will improve Oswestry by at least 10 points (MCID) to reflect improvement in ADLs/pain reduction.  PROGRESSING  Pt will demonstrate independence and report compliance with HEP. MET  Pt. Will report return to walking for greater than 30 minutes with minimal pain/limitation, indicating improved desired functional capacity PROGRESSING  Patient will display 5/5 strength with hip IR, flexion and abduction MET  Patient will display ability to complete hip bridge to full hip extension, indicating improved functional lower extremity posterior chain strength MET PREVIOUS SESSION, MILD BILATERAL HIP EXTERNAL ROTATION STRENGTH LIMITATIONS ASSESS TODAY  Patient will report being able to engage in heavy work, such as painting or yard work, with no more than 2/10 discomfort in lower back PROGRESS 3/10  Patient will report reduction in subjective lower back stiffness following activity then rest then movement to no higher than a 2/10 NEW  Patient will display ability to  a 25lbs weight at least 5 times from floor to wait height with minimal lower back discomfort NEW  Patient will report at least 50% reduction in anterior hip discomfort with prolonged sitting NEW          Plan:   1x every other week for 6 weeks  Continue progression of endurance/tolerance with repetitive lifting/bending/twisting exercises

## 2025-03-14 ASSESSMENT — ENCOUNTER SYMPTOMS
PSYCHIATRIC NEGATIVE: 1
CONSTITUTIONAL NEGATIVE: 1
GASTROINTESTINAL NEGATIVE: 1
ALLERGIC/IMMUNOLOGIC NEGATIVE: 1
CARDIOVASCULAR NEGATIVE: 1
ENDOCRINE NEGATIVE: 1
HEMATOLOGIC/LYMPHATIC NEGATIVE: 1
MUSCULOSKELETAL NEGATIVE: 1
EYES NEGATIVE: 1
RESPIRATORY NEGATIVE: 1

## 2025-03-27 ENCOUNTER — TREATMENT (OUTPATIENT)
Dept: PHYSICAL THERAPY | Facility: CLINIC | Age: 70
End: 2025-03-27
Payer: MEDICARE

## 2025-03-27 DIAGNOSIS — M25.551 RIGHT HIP PAIN: ICD-10-CM

## 2025-03-27 DIAGNOSIS — S32.010D CLOSED WEDGE COMPRESSION FRACTURE OF L1 VERTEBRA WITH ROUTINE HEALING: ICD-10-CM

## 2025-03-27 PROCEDURE — 97110 THERAPEUTIC EXERCISES: CPT | Mod: GP | Performed by: PHYSICAL THERAPIST

## 2025-03-27 PROCEDURE — 97140 MANUAL THERAPY 1/> REGIONS: CPT | Mod: GP | Performed by: PHYSICAL THERAPIST

## 2025-03-27 NOTE — PROGRESS NOTES
Physical Therapy Treatment/Recheck    Patient Name: Rodolfo Wellington  MRN: [id number]  Today's Date: 3/27/2025  Time Calculation  Start Time: 1435  Stop Time: 1518  Time Calculation (min): 43 min     PT Therapeutic Procedures Time Entry  Manual Therapy Time Entry: 20  Therapeutic Exercise Time Entry: 23                 Current Problem  1. Closed wedge compression fracture of L1 vertebra with routine healing  Follow Up In Physical Therapy      2. Right hip pain  Follow Up In Physical Therapy                    Insurance:  Visit number: 17  Approved number of visits: BMN       MEDICARE/AARP 2230 ($0 USED ) COPAY 0 DED 0   COVERAGE 80/100 OOP 0 AARP TO FOLLOW MEDICARE   NO AUTH REQ [id number]/ALL      Precautions   none    Subjective   Subjective:   Pt reports that he has taken a couple of days off from his exercies to see how he feels not doing them. Overall he has been OK. Patient reports that he will continue to notice generalized lower back and anterior hip discomfort with prolonged standing and sometimes prolonged walking activities. Patient reports being slightly frustrated regarding the slowing of his recovery, but overall feels like he has come a long way  Pain   0/10 at best, at worst 3/10    Objective     Treatments:    Manual 20 miniutes  -STM through thoracolumbar parapspinal musculautre   -Bilateral femoral nerve gliding  -L5/S1 sacral distraction  CPA joint mobilization T4 to L5/S1, Grade I-II    Prone Knee flexion hip extension 1x8  Standing unilateral FM row with trunk rotation 7.5#, 2x8 bilaterally  Resisted walk backs with Row holds 15# FM, 2 sets of 5 rounds of 5 steps  Partial Kickstand deadlift with partial rotation, 5 lbs, 2x8 bilaterally    Discussion/review/modification of HEP 5 minutes    OP EDUCATION:  Access Code: [email or web address]  URL: [email or web address]  Date: 01/23/2025  Prepared by: Jaiden Raphael    Exercises  - Static Prone on Elbows  - 1 x daily - 7 x weekly - 2-3 minutes  hold  - Prone Press Up  - 1 x daily - 7 x weekly - 3 sets - 5 reps  -Trunk rotation with theraband GTB 2x10 each day  Access Code: [email or web address]  URL: [email or web address]  Date: 02/27/2025  Prepared by: Jaiden Raphael    Exercises  - Prone Hip Extension  - 1 x daily - 7 x weekly - 2 sets - 10 reps    Access Code: [email or web address]  URL: [email or web address]/  Date: 03/13/2025  Prepared by: Jaiden Raphael    Exercises  - Standing Sidebends  - 1 x daily - 7 x weekly - 1 sets - 10 reps  - Reverse Chop with Elbows Bent  - 1 x daily - 7 x weekly - 2 sets - 5 reps  Assessment:  Patient tolerated treatment well. Patient reported generalized lower back muscular fatigue following treatment, but did not report characteristic lower back discomfort during treatment. We continue to progress mutli-planar thoracolumbar muscular endurance and strength. Patient does require cues periodic to minimized rounded back posture during exercises but is able to correct with cueing. Patient appeared to understand all education provided. Will continue to benefit from skilled intervention to address the above mentioned impairments and limitations. No adverse reactions were observed or reported from patient at the conclusion of today's session.              Plan:   Continue to progress multiplanar truncal strength, progress anterior hip mobility as able

## 2025-04-10 ENCOUNTER — TREATMENT (OUTPATIENT)
Dept: PHYSICAL THERAPY | Facility: CLINIC | Age: 70
End: 2025-04-10
Payer: MEDICARE

## 2025-04-10 DIAGNOSIS — M25.551 RIGHT HIP PAIN: ICD-10-CM

## 2025-04-10 DIAGNOSIS — S32.010D CLOSED WEDGE COMPRESSION FRACTURE OF L1 VERTEBRA WITH ROUTINE HEALING: ICD-10-CM

## 2025-04-10 PROCEDURE — 97140 MANUAL THERAPY 1/> REGIONS: CPT | Mod: GP | Performed by: PHYSICAL THERAPIST

## 2025-04-10 PROCEDURE — 97110 THERAPEUTIC EXERCISES: CPT | Mod: GP | Performed by: PHYSICAL THERAPIST

## 2025-04-10 NOTE — PROGRESS NOTES
Physical Therapy Treatment/Recheck    Patient Name: Rodolfo Wellington  MRN: [id number]  Today's Date: 4/10/2025  Time Calculation  Start Time: 1418  Stop Time: 1458  Time Calculation (min): 40 min     PT Therapeutic Procedures Time Entry  Manual Therapy Time Entry: 15  Therapeutic Exercise Time Entry: 20  Therapeutic Activity Time Entry: 5                 Current Problem  1. Closed wedge compression fracture of L1 vertebra with routine healing  Follow Up In Physical Therapy      2. Right hip pain  Follow Up In Physical Therapy                      Insurance:  Visit number: 18  Approved number of visits: BMN       MEDICARE/AARP 2230 ($0 USED ) COPAY 0 DED 0   COVERAGE 80/100 OOP 0 AARP TO FOLLOW MEDICARE   NO AUTH REQ [id number]      Precautions   none    Subjective   Subjective:   Pt reports that he was able to sit in a wood chair for 20 minutes without issues. He continues to have periods of times where his back feels better and times where it more sore. He continues to feel good initially during the day and then he will get fatigued as the day goes on. Patient reports that yesterday he was raking and he felt pretty good after this. He reports that he has scheduled a follow up with the physician he first saw following his initial injury for further follow up and potential guidance on brace usage.   Pain   0/10 at best, at worst 3/10    Objective     Treatments:    Manual 15 miniutes  -STM through thoracolumbar parapspinal musculautre   -Bilateral femoral nerve gliding  -L5/S1 sacral distraction  CPA joint mobilization T4 to L5/S1, Grade I-II      Deadbug- Hooklying 2x8 bilaterally  Bent over horizontal adduction with trunk rotation 2lbs 1x8-10  Diagonal lawnmower pull 1x8-10 bilaterally 5lbs  Standing Wall anais 1x10  Seated thoracic extension over chair 1x10    Discussion/review/modification of HEP 8 minutes    OP EDUCATION:  Access Code: [email or web address]  URL: [email or web address]  Date:  01/23/2025  Prepared by: Jaiden Raphael    Exercises  - Static Prone on Elbows  - 1 x daily - 7 x weekly - 2-3 minutes hold  - Prone Press Up  - 1 x daily - 7 x weekly - 3 sets - 5 reps  -Trunk rotation with theraband GTB 2x10 each day  Access Code: [email or web address]  URL: [email or web address]  Date: 02/27/2025  Prepared by: Jaiden Chelette    Exercises  - Prone Hip Extension  - 1 x daily - 7 x weekly - 2 sets - 10 reps    Access Code: [email or web address]  URL: [email or web address]  Date: 03/13/2025  Prepared by: Jaiden Chongette    Exercises  - Standing Sidebends  - 1 x daily - 7 x weekly - 1 sets - 10 reps  - Reverse Chop with Elbows Bent  - 1 x daily - 7 x weekly - 2 sets - 5 reps    Access Code: [email or web address]  URL: [email or web address]  Date: 04/10/2025  Prepared by: Jaiden Raphael    Exercises  - Seated Thoracic Lumbar Extension  - 1 x daily - 7 x weekly - 2 sets - 6 reps  Assessment:  Patient tolerated treatment well. Patient is displaying tolerance with isometric, concentric and eccentric based truncal exercises. Patient is also tolerating anti-rotation, anti-flexion and multiplanar based movements well. He is challenged repetitive loaded twisting movement, but his tolerance to these exercises with load is improving. Increased focus on thoracic spine extension capacity to promote improve overall thoracolumbar posture. Patient tolerated well.  Patient appeared to understand all education provided. Will continue to benefit from skilled intervention to address the above mentioned impairments and limitations. No adverse reactions were observed or reported from patient at the conclusion of today's session.                  Plan:   Reassessment of objective measures, discussion of care moving forward

## 2025-04-17 ENCOUNTER — HOSPITAL ENCOUNTER (OUTPATIENT)
Dept: RADIOLOGY | Facility: CLINIC | Age: 70
Discharge: HOME | End: 2025-04-17
Payer: MEDICARE

## 2025-04-17 ENCOUNTER — APPOINTMENT (OUTPATIENT)
Dept: ORTHOPEDIC SURGERY | Facility: CLINIC | Age: 70
End: 2025-04-17
Payer: MEDICARE

## 2025-04-17 VITALS — HEIGHT: 71 IN | WEIGHT: 185 LBS | BODY MASS INDEX: 25.9 KG/M2

## 2025-04-17 DIAGNOSIS — M47.816 LUMBAR SPONDYLOSIS: ICD-10-CM

## 2025-04-17 DIAGNOSIS — M40.15 OTHER SECONDARY KYPHOSIS, THORACOLUMBAR REGION: ICD-10-CM

## 2025-04-17 DIAGNOSIS — M47.816 LUMBAR SPONDYLOSIS: Primary | ICD-10-CM

## 2025-04-17 DIAGNOSIS — M48.062 SPINAL STENOSIS OF LUMBAR REGION WITH NEUROGENIC CLAUDICATION: ICD-10-CM

## 2025-04-17 PROCEDURE — 3008F BODY MASS INDEX DOCD: CPT | Performed by: PHYSICIAN ASSISTANT

## 2025-04-17 PROCEDURE — 1123F ACP DISCUSS/DSCN MKR DOCD: CPT | Performed by: PHYSICIAN ASSISTANT

## 2025-04-17 PROCEDURE — 99213 OFFICE O/P EST LOW 20 MIN: CPT | Performed by: PHYSICIAN ASSISTANT

## 2025-04-17 PROCEDURE — 1159F MED LIST DOCD IN RCRD: CPT | Performed by: PHYSICIAN ASSISTANT

## 2025-04-17 PROCEDURE — 72110 X-RAY EXAM L-2 SPINE 4/>VWS: CPT

## 2025-04-17 ASSESSMENT — PAIN - FUNCTIONAL ASSESSMENT: PAIN_FUNCTIONAL_ASSESSMENT: 0-10

## 2025-04-21 NOTE — PROGRESS NOTES
HPI:  Rodolfo Wellington is a 69 y.o. male who presents to the spine clinic today for follow up of low back pain.     LOV 10/03/24. Comes into the office today to check in. Has been participating in PT which has been helpful, however feels like he has hit a plateau in terms of improvement.     Main complaint is low back pain that increases with activity. Pain with bending/twisting and with ambulating over a mile. Pain improves with sitting.   Denies leg pain/numbness/tingling or weakness.    ROS:  Reviewed on EMR and patient intake sheet.    PMH/SH:  Reviewed on EMR and patient intake sheet.    Exam:  Physical Exam  Spine Musculoskeletal Exam    Well appearing, NAD  Pleasant & cooperative  BMI 25.80  Decreased ROM lumbar spine with rotation, flexion/extension  No paraspinal muscle spasms  lower extremity sensation intact to light touch  lower extremity motor 5/5 throughout; no focal motor weakness  normal gait & station; no assistive devices  neg SLR  Reflexes: hypo    Radiology:     Xrays lumbar spine done in the office today 04/17/25 personally reviewed, along with the accompanying report when available.   L1 compression fracture stable in appearance with focal kyphosis at this level. No abnormal motion with flex/ext. Moderate degenerative changes    Assessment and Plan:  1. Lumbar spondylosis (Primary)  2. Other secondary kyphosis, thoracolumbar region  3. Spinal stenosis of lumbar region with neurogenic claudication  - - XR lumbar spine complete 4+ views; Future  - Referral to Physical Therapy; Future    I reviewed the XR images with Rodolfo and his wife today. Discussed likelihood of intermittent low back pain that will likely continue for the remainder of his lifetime. Encouraged continuing with therapy and core strengthening exercises. Caution with bending/lifting/twisting activities that can potentially cause exacerbation of pain.     Follow up as needed.     Patient in agreement to plan of care. Of course Rodolfo CALIX  Amish is welcome to call at anytime with questions or concerns.     Liss Watters PA-C  Department of Orthopaedic Surgery    [address] [place] [address]    Voicemail: [phone]   Appts: [phone]  Fax: [phone]

## 2025-04-23 NOTE — PROGRESS NOTES
Physical Therapy Treatment/Recheck    Patient Name: Rodolfo Wellington  MRN: [id number]  Today's Date: 4/24/2025  Time Calculation  Start Time: 1417  Stop Time: 1500  Time Calculation (min): 43 min     PT Therapeutic Procedures Time Entry  Therapeutic Activity Time Entry: 43                 Current Problem  1. Closed wedge compression fracture of L1 vertebra with routine healing                          Insurance:  Visit number: 18  Approved number of visits: BMN       MEDICARE/AARP 2230 ($0 USED ) COPAY 0 DED 0   COVERAGE 80/100 OOP 0 AARP TO FOLLOW MEDICARE   NO AUTH REQ [id number]/ALL      Precautions   none    Subjective   Subjective:   Pt reports that he had a follow up with his referring physician and he reports that this was very informative. He reports that she strongly encouraged continued physical therapy for progressive core strength to reduce his continued difficulties with prolonged standing and walking activities that involve pushing/pulling/twisting based movements. He reports that he will continue to have gradually progressing lower back discomfort with prolonged standing and walking activities, but with brief sitting rest, this improves. He continues to be weary of repetitive lifting based activities secondary to continued discomfort and some difficulty with these types of movements  Pain   0/10 at best, at worst 3/10    Objective   Observation: Unremarkable          Transfers: Independent  Gait: Unremarkable     Lumbar ROM (* indicates pain)  Flex:  75 % full range  Ext:  90  % full range  Sidebending: R-  100 % full range L-  100 % full range  Rotation: R- 100  % full range L-  100 % full range                 Hip MMT and Muscular Performance (* indicates pain)  Flex: L  5/5 ; R  5/5  ABD: L   5/5; R   5/5  IR: L- 5/5 R- 5/5  ER: L- 5-/5 R- 5-/5  Hip extension Strength: R-4/5 L-4/5    Single leg hip bridge x10: R- able to complete to 80% full hip extension, minor impairment with eccentric lowering control,  mildly  L- able to complete to 90% full hip extension       Lifting Mechanics Assessment (25lbs, 5 times floor to waist and return)  -Patient displays excessive forward trunk lean with reduced hinging through hips and reduced flexion through knees, patient reported progressive fatigue and mild soreness at the conclusion of this bout  Outcome Measure: AMANDA- 7      Treatments:  Reassessment of objective measures, discussion of plan of care moving forward, discussion/modification of up to date HEP, education regarding rationale of plan for continue progression toward desired level of function, review/discussion regarding HEP and progression of PT care to improved functional standing/walking endurance and improved comfort/capacity for repetitive lifting based activities 35 minutes    Floor to waist lifts 1x3 15lbs, 1x5 20lbs, 1x5 25lbs    Assessment  Patient tolerated reassessment and treatment well. Patient has displayed gradual improvements in quality and quantity of tolerable lumbar spine extension ROM. Patient does continue to display some limitations and weakness through the posterior chain/posterior hip musculature. Patient does continue to display impaired functional lifting mechanics and endurance with these activities. Patient also continues to report mild to moderate subjective limitations, specifically related to functional lifting/twisting/pushing/pulling activities.The patient would continue to benefit from skilled intervention to address the above mentioned impairments to improve functional capacity and QOL, specifically related to improving the capacity for heavy ADLs integral to daily life, such as care for his home and property. The patient appeared to understand all education given and displayed verbal agreement with therapy plan of care.   OP EDUCATION:  Access Code: [email or web address]  URL: [email or web address]  Date: 01/23/2025  Prepared by: Jaiden Raphael    Exercises  - Static Prone on  Elbows  - 1 x daily - 7 x weekly - 2-3 minutes hold  - Prone Press Up  - 1 x daily - 7 x weekly - 3 sets - 5 reps  -Trunk rotation with theraband GTB 2x10 each day  Access Code: [email or web address]  URL: [email or web address]  Date: 02/27/2025  Prepared by: Jaiden Chelette    Exercises  - Prone Hip Extension  - 1 x daily - 7 x weekly - 2 sets - 10 reps    Access Code: [email or web address]  URL: [email or web address]  Date: 03/13/2025  Prepared by: Jaiden Chelette    Exercises  - Standing Sidebends  - 1 x daily - 7 x weekly - 1 sets - 10 reps  - Reverse Chop with Elbows Bent  - 1 x daily - 7 x weekly - 2 sets - 5 reps    Access Code: [email or web address]  URL: [email or web address]/  Date: 04/10/2025  Prepared by: Jaiden Chelette    Exercises  - Seated Thoracic Lumbar Extension  - 1 x daily - 7 x weekly - 2 sets - 6 reps                     Goals:  Pt will report at least 75% improvement in  pain during everyday activities. ALMOST MET- CONTINUES TO RATE AT 70-80%  Pt will demo full and symmetrical AROM of lumbar spine without pain. MILD PROGRESSION-IMPROVED EXTENSION  Pt will improve Oswestry by at least 10 points (MCID) to reflect improvement in ADLs/pain reduction.  PROGRESSING- 1 POINT IMPROVEMENT TODAY  Pt will demonstrate independence and report compliance with HEP. MET  Pt. Will report return to walking for greater than 30 minutes with minimal pain/limitation, indicating improved desired functional capacity PROGRESSING- REPORTS DISCOMFORT WITH THIS, BUT ABLE TO GREATLY REDUCE WITH BRIEF SITTING REST  Patient will display 5/5 strength with hip IR, flexion and abduction MET  Patient will display ability to complete hip bridge to full hip extension, indicating improved functional lower extremity posterior chain strength MET PREVIOUS SESSION, MILD BILATERAL HIP EXTERNAL ROTATION STRENGTH LIMITATIONS ASSESS TODAY  Patient will report being able to engage in heavy work, such as painting or  yard work, with no more than 2/10 discomfort in lower back PROGRESSING- REPORTS 4/10 DISCOMFORT AT CONCLUSION OF MOWING LAWN, REDUCED WITH REST  Patient will report reduction in subjective lower back stiffness following activity then rest then movement to no higher than a 2/10 MET  Patient will display ability to  a 25lbs weight at least 5 times from floor to waist height with minimal lower back discomfort PROGRESSING-COMPENSATIONS IN REGARDS TO TECHNIQUE, MILD CONCORDANT LOWER BACK PAIN BUT ABLE TO COMPLETE  Patient will report at least 50% reduction in anterior hip discomfort with prolonged sitting NEW PROGRESSING-MORE SO LOWER BACK STIFFNESS  Patient will be able to mow both front and the back yard consecutively NEW  Patient will report at least 90% on Global Rating of Function Scale    Plan:   1x every other week for 3 visits

## 2025-04-24 ENCOUNTER — TREATMENT (OUTPATIENT)
Dept: PHYSICAL THERAPY | Facility: CLINIC | Age: 70
End: 2025-04-24
Payer: MEDICARE

## 2025-04-24 DIAGNOSIS — S32.010D CLOSED WEDGE COMPRESSION FRACTURE OF L1 VERTEBRA WITH ROUTINE HEALING: Primary | ICD-10-CM

## 2025-04-24 PROCEDURE — 97530 THERAPEUTIC ACTIVITIES: CPT | Mod: GP | Performed by: PHYSICAL THERAPIST

## 2025-05-07 ENCOUNTER — APPOINTMENT (OUTPATIENT)
Dept: PHYSICAL THERAPY | Facility: CLINIC | Age: 70
End: 2025-05-07
Payer: MEDICARE

## 2025-05-08 ENCOUNTER — TREATMENT (OUTPATIENT)
Dept: PHYSICAL THERAPY | Facility: CLINIC | Age: 70
End: 2025-05-08
Payer: MEDICARE

## 2025-05-08 DIAGNOSIS — S32.010D CLOSED WEDGE COMPRESSION FRACTURE OF L1 VERTEBRA WITH ROUTINE HEALING: ICD-10-CM

## 2025-05-08 DIAGNOSIS — M25.551 RIGHT HIP PAIN: ICD-10-CM

## 2025-05-08 PROCEDURE — 97530 THERAPEUTIC ACTIVITIES: CPT | Mod: GP | Performed by: PHYSICAL THERAPIST

## 2025-05-08 PROCEDURE — 97140 MANUAL THERAPY 1/> REGIONS: CPT | Mod: GP | Performed by: PHYSICAL THERAPIST

## 2025-05-08 PROCEDURE — 97110 THERAPEUTIC EXERCISES: CPT | Mod: GP | Performed by: PHYSICAL THERAPIST

## 2025-05-08 NOTE — PROGRESS NOTES
Physical Therapy Treatment/Recheck    Patient Name: Rodolfo Wellington  MRN: [id number]  Today's Date: 5/8/2025  Time Calculation  Start Time: 1602  Stop Time: 1644  Time Calculation (min): 42 min     PT Therapeutic Procedures Time Entry  Manual Therapy Time Entry: 20  Therapeutic Exercise Time Entry: 8  Therapeutic Activity Time Entry: 14                 Current Problem  1. Closed wedge compression fracture of L1 vertebra with routine healing  Follow Up In Physical Therapy      2. Right hip pain  Follow Up In Physical Therapy                          Insurance:  Visit number: 18  Approved number of visits: BMN       MEDICARE/AARP 2230 ($0 USED ) COPAY 0 DED 0   COVERAGE 80/100 OOP 0 AARP TO FOLLOW MEDICARE   NO AUTH REQ [id number]      Precautions   none    Subjective   Subjective:   Pt reports that he was able to mow his front and back yard and then stand and talk with a neighbor for an hour. He was fairly sore after this. He is feeling better today.   Pain   6/10    Objective         Treatments:  Manual 20 minutes  STM through bilateral lumbosacral/thoracic paraspinal musculature  CPA joint mobs grade II CT junction to T10  Femoral nerve gliding bilaterally  Prone lumbopelvic dissociation mobilization bilaterally    Prone hip extension 2x10 2.5lbs, 3lbs    Sled push 50lbs 50 feet/KB lift to waist from 4 inch step x5 15lbs/15lbs unilateral suitcase carry 100 feet triple set, 3x  Standing Donkey Kicks 2x10    Assessment  Patient tolerated treatment well. Patient continues to be challenged with resisted hip extension based movements, secondary to fatigue and subjective lower back soreness with fatigue. Combined functional pushing/pulling/lifting activities today in circuit to promote improved functional heavy iADL endurance and patient able to tolerate well. Posterior chain strength/endurance and functional pushing/pulling/lifting tolerance will continue to be the focus of care moving forward.  Patient appeared to  understand all education provided. Will continue to benefit from skilled intervention to address the above mentioned impairments and limitations. No adverse reactions were observed or reported from patient at the conclusion of today's session.    Physical Therapy Problem List  1.  2.  3.     OP EDUCATION:  Access Code: [email or web address]  URL: [email or web address]  Date: 01/23/2025  Prepared by: Jaiden Chongette    Exercises  - Static Prone on Elbows  - 1 x daily - 7 x weekly - 2-3 minutes hold  - Prone Press Up  - 1 x daily - 7 x weekly - 3 sets - 5 reps  -Trunk rotation with theraband GTB 2x10 each day  Access Code: [email or web address]  URL: [email or web address]  Date: 02/27/2025  Prepared by: Jaiden Chelette    Exercises  - Prone Hip Extension  - 1 x daily - 7 x weekly - 2 sets - 10 reps    Access Code: [email or web address]  URL: [email or web address]/  Date: 03/13/2025  Prepared by: Jaiden Chelette    Exercises  - Standing Sidebends  - 1 x daily - 7 x weekly - 1 sets - 10 reps  - Reverse Chop with Elbows Bent  - 1 x daily - 7 x weekly - 2 sets - 5 reps    Access Code: [email or web address]  URL: [email or web address]://[email or web address]/  Date: 04/10/2025  Prepared by: Jaiden Chelette    Exercises  - Seated Thoracic Lumbar Extension  - 1 x daily - 7 x weekly - 2 sets - 6 reps                       Plan:   Progress posterior chain strength and functional pushing/pulling/lifting resilience

## 2025-05-19 NOTE — PROGRESS NOTES
Physical Therapy Treatment/Recheck    Patient Name: Rodolfo Wellington  MRN: [id number]  Today's Date: 5/20/2025  Time Calculation  Start Time: 1446  Stop Time: 1540  Time Calculation (min): 54 min     PT Therapeutic Procedures Time Entry  Manual Therapy Time Entry: 30  Therapeutic Exercise Time Entry: 24                 Current Problem  1. Closed wedge compression fracture of L1 vertebra with routine healing  Follow Up In Physical Therapy      2. Right hip pain  Follow Up In Physical Therapy                            Insurance:  Visit number: 19  Approved number of visits: BMN       MEDICARE/AARP 2230 ($0 USED ) COPAY 0 DED 0   COVERAGE 80/100 OOP 0 AARP TO FOLLOW MEDICARE   NO AUTH REQ [id number]/ALL      Precautions   none    Subjective   Subjective:   Pt reports that he has been doing well. He continues to report that when he mows the lawn, he will take a break in between mowing the front and back. After mowing the front, he feels fatigue through his lower back. Following a rest, he is able to mow the back. Overall, he reports that he is bouncing back quicker with lower level physical active ADLs, like light yard work or general house work  Pain   3/10    Objective         Treatments:  Manual 30 minutes  STM through bilateral lumbosacral/thoracic paraspinal musculature  CPA joint mobs grade II CT junction to T10  Femoral nerve gliding bilaterally  Prone lumbopelvic dissociation mobilization bilaterally  Sacral distraction mobilization  Assisted anterior hip stretch with abdominal brace, bilaterally  Unilateral posterior pelvic rotation, bilaterally    Standing end range pallof press  Green tubing 1x10 bilaterally  Standing Donkey Kicks 2x12 2.5 lbs  Standing straight arm pulldowns with foot on 8 inch box 1x10 bilaterally  Weighted step up with unilateral weight hold 10lbs, 2x8    Discussion/refinement of HEP program 5 minutes            Assessment  Patient tolerated treatment well. Patient reported abolishment of  baseline lower back muscular discomfort/fatigue following manual therapy based treatment today. Patient was challenged with introduction of anti-flexion based exercises today, but was able to properly engage/utilize posterior chain musculature and abdominal musculature to stabilize during this movement. Tolerated progression of anti-rotation exercise to end range of thoracolumbar rotation well. Patient continues to be challenged with resisted hip extension based movements, reporting fatigue with this. Patient was able to tolerate progression of resisted hip extension strengthening well today. Added straight arm pull down with resistance to HEP to progress anti-flexion capacity. Patient appeared to understand all education provided. Will continue to benefit from skilled intervention to address the above mentioned impairments and limitations. No adverse reactions were observed or reported from patient at the conclusion of today's session.           OP EDUCATION:  Access Code: [email or web address]  URL: [email or web address]  Date: 01/23/2025  Prepared by: Jaiden Raphael    Exercises  - Static Prone on Elbows  - 1 x daily - 7 x weekly - 2-3 minutes hold  - Prone Press Up  - 1 x daily - 7 x weekly - 3 sets - 5 reps  -Trunk rotation with theraband GTB 2x10 each day  Access Code: [email or web address]  URL: [email or web address]  Date: 02/27/2025  Prepared by: Jaiden Raphael    Exercises  - Prone Hip Extension  - 1 x daily - 7 x weekly - 2 sets - 10 reps    Access Code: [email or web address]  URL: [email or web address]  Date: 03/13/2025  Prepared by: Jaiden Raphael    Exercises  - Standing Sidebends  - 1 x daily - 7 x weekly - 1 sets - 10 reps  - Reverse Chop with Elbows Bent  - 1 x daily - 7 x weekly - 2 sets - 5 reps    Access Code: [email or web address]  URL: [email or web address]  Date: 04/10/2025  Prepared by: Jaiden Raphael    Exercises  - Seated Thoracic Lumbar Extension  - 1 x  daily - 7 x weekly - 2 sets - 6 reps         Access Code: [email or web address]  URL: [email or web address]  Date: 05/20/2025  Prepared by: Jaiden Raphael    Exercises  - Shoulder extension with resistance - Neutral  - 1 x daily - 7 x weekly - 2 sets - 10 reps              Plan:   Reassessment of objective measures, discussion of POC moving forward

## 2025-05-20 ENCOUNTER — TREATMENT (OUTPATIENT)
Dept: PHYSICAL THERAPY | Facility: CLINIC | Age: 70
End: 2025-05-20
Payer: MEDICARE

## 2025-05-20 DIAGNOSIS — M25.551 RIGHT HIP PAIN: ICD-10-CM

## 2025-05-20 DIAGNOSIS — S32.010D CLOSED WEDGE COMPRESSION FRACTURE OF L1 VERTEBRA WITH ROUTINE HEALING: ICD-10-CM

## 2025-05-20 PROCEDURE — 97140 MANUAL THERAPY 1/> REGIONS: CPT | Mod: GP | Performed by: PHYSICAL THERAPIST

## 2025-05-20 PROCEDURE — 97110 THERAPEUTIC EXERCISES: CPT | Mod: GP | Performed by: PHYSICAL THERAPIST

## 2025-06-03 ENCOUNTER — TREATMENT (OUTPATIENT)
Dept: PHYSICAL THERAPY | Facility: CLINIC | Age: 70
End: 2025-06-03
Payer: MEDICARE

## 2025-06-03 DIAGNOSIS — S32.010D CLOSED WEDGE COMPRESSION FRACTURE OF L1 VERTEBRA WITH ROUTINE HEALING: Primary | ICD-10-CM

## 2025-06-03 PROCEDURE — 97530 THERAPEUTIC ACTIVITIES: CPT | Mod: GP | Performed by: PHYSICAL THERAPIST

## 2025-06-03 PROCEDURE — 97110 THERAPEUTIC EXERCISES: CPT | Mod: GP | Performed by: PHYSICAL THERAPIST

## 2025-06-03 PROCEDURE — 97140 MANUAL THERAPY 1/> REGIONS: CPT | Mod: GP | Performed by: PHYSICAL THERAPIST

## 2025-06-03 NOTE — PROGRESS NOTES
Physical Therapy Treatment/Recheck    Patient Name: Rodolfo Wellington  MRN: [id number]  Today's Date: 6/3/2025  Time Calculation  Start Time: 1447  Stop Time: 1536  Time Calculation (min): 49 min     PT Therapeutic Procedures Time Entry  Manual Therapy Time Entry: 20  Therapeutic Exercise Time Entry: 5  Therapeutic Activity Time Entry: 20                 Current Problem  1. Closed wedge compression fracture of L1 vertebra with routine healing                                Insurance:  Visit number: 20  Approved number of visits: BMN       MEDICARE/AARP 2230 ($0 USED ) COPAY 0 DED 0   COVERAGE 80/100 OOP 0 AARP TO FOLLOW MEDICARE   NO AUTH REQ [id number]      Precautions   none    Subjective   Subjective:   Pt reports that he continues to have discomfort with prolonged standing/heavier activity. He had to do a lot of yard work over the weekend and he was fairly sore following this. He was able to feel better following a night of sleep. Overall he feels like he is at about 80% function. He continues to report discomfort with prolonged heavy ADLs, like yard work, but is able to recover with rest. He continues to engage in his HEP daily    Pain   0/10    Objective     Observation: Unremarkable           Transfers: Independent  Gait: Unremarkable     Lumbar ROM (* indicates pain)  Flex:  75 % full range  Ext:  90  % full range  Sidebending: R-  100 % full range L-  100 % full range tension  Rotation: R- 100  % full range L-  100 % full range                 Hip MMT and Muscular Performance (* indicates pain)  Flex: L  5/5 ; R  5/5 mild lower back discomfort  ABD: L   5/5; R   5/5  IR: L- 5/5 R- 5/5  ER: L- 5-/5 R- 5-/5  Hip extension Strength: R-4+/5 L-4+/5             Lifting Mechanics Assessment (30lbs, 5 times floor to waist and return)  -Patient displays mildy excessive forward trunk lean, patient displaying improved capacity to complete with hinging through hips and use of knee flexion compared to previous  assessments    Outcome Measure: AMANDA- 9    Treatments:  Manual 20 minutes  STM through bilateral lumbosacral/thoracic paraspinal musculature  CPA joint mobs grade II CT junction to T10  Femoral nerve gliding bilaterally  Prone lumbopelvic dissociation mobilization bilaterally    Reassessment of objective measures, discussion of plan of care moving forward, discussion/modification of up to date HEP, education regarding rationale of plan for continue progression toward desired level of function 20 minutes        Standing straight arm pulldowns with foot on 8 inch box 1x10 bilaterally, black double hand tubing  Standing straight arm pulldowns black tubing 1x10              Assessment  Patient tolerated reassessment and treatment well. The patient has displayed improvement in bilateral hip extension strength and improvement in repetitive lifting mechanics. Patient is displaying similar levels of lumbar spine AROM, with mild end range tightness with extension and bilateral lumbar sidebending. Patient also continues to display symmetrical bilateral hip flexion, IR and ER muscular strength. Patient is continuing to display mild compensations with functional lifting mechanics, but is able to complete repetitive floor to waist lifting with minimal lower back discomfort. Overall patient has achieved or made strong progression to all previously established goals of physical therapy.  Via shared decision making, it was decided that the patient will focus on the prescribed HEP to maintain and improve ROM/strength/functional mobility gains made in physical therapy and will contact our office with any issue moving forward.  Discussed with patient potential for gradual improvements in reduced lower back discomfort with activity as he continues to engage in prescribed HEP and as time moves forward. Discussed potential benefit in periodic progressions of HEP/episodes of physical therapy care in the future if he is to experience  exacerbation of symptoms or regression of function . The patient appeared to understand all education given and displayed verbal agreement with therapy plan of care.           OP EDUCATION:  Access Code: [email or web address]  URL: [email or web address]  Date: 01/23/2025  Prepared by: Jaiden Chelette    Exercises  - Static Prone on Elbows  - 1 x daily - 7 x weekly - 2-3 minutes hold  - Prone Press Up  - 1 x daily - 7 x weekly - 3 sets - 5 reps  -Trunk rotation with theraband GTB 2x10 each day  Access Code: [email or web address]  URL: [email or web address]  Date: 02/27/2025  Prepared by: Jaiden Chelette    Exercises  - Prone Hip Extension  - 1 x daily - 7 x weekly - 2 sets - 10 reps    Access Code: [email or web address]  URL: [email or web address]  Date: 03/13/2025  Prepared by: Jaiden Chelette    Exercises  - Standing Sidebends  - 1 x daily - 7 x weekly - 1 sets - 10 reps  - Reverse Chop with Elbows Bent  - 1 x daily - 7 x weekly - 2 sets - 5 reps    Access Code: [email or web address]  URL: [email or web address]  Date: 04/10/2025  Prepared by: Jaiden Chelette    Exercises  - Seated Thoracic Lumbar Extension  - 1 x daily - 7 x weekly - 2 sets - 6 reps         Access Code: [email or web address]  URL: [email or web address]  Date: 05/20/2025  Prepared by: Jaiden Chelette    Exercises  - Shoulder extension with resistance - Neutral  - 1 x daily - 7 x weekly - 2 sets - 10 reps      Goals Reviewed and discussed today  Pt will report at least 75% improvement in  pain during everyday activities. MET 80%  Pt will demo full and symmetrical AROM of lumbar spine without pain. MINIMAL CHANGE  Pt will improve Oswestry by at least 10 points (MCID) to reflect improvement in ADLs/pain reduction.  MINIMAL CHANGE  Pt will demonstrate independence and report compliance with HEP. MET  Pt. Will report return to walking for greater than 30 minutes with minimal pain/limitation, indicating improved desired  functional capacity MINIMAL CHANGE  Patient will display 5/5 strength with hip IR, flexion and abduction MET  Patient will display ability to complete hip bridge to full hip extension, indicating improved functional lower extremity posterior chain strength MET FOR ALL EXPECT HIP EXTENSION  Patient will report being able to engage in heavy work, such as painting or yard work, with no more than 2/10 discomfort in lower back NOT ASSESSED TODAY  Patient will report reduction in subjective lower back stiffness following activity then rest then movement to no higher than a 2/10 MET  Patient will display ability to  a 25lbs weight at least 5 times from floor to waist height with minimal lower back discomfort PROGRESSING-MILD COMPENSATIONS IN REGARDS TO TECHNIQUE, MILD CONCORDANT LOWER BACK PAIN BUT ABLE TO COMPLETE  Patient will report at least 50% reduction in anterior hip discomfort with prolonged sitting  PROGRESSING-MORE SO LOWER BACK STIFFNESS  Patient will be able to mow both front and the back yard consecutively PROGRESSING-ABLE TO COMPLETE WITH BRIEF BREAK IN BETWEEN  Patient will report at least 90% on Global Rating of Function Scale PROGRESSION 80%      Plan:   Trial HEP, follow up as needed

## 2025-09-24 ENCOUNTER — APPOINTMENT (OUTPATIENT)
Dept: PRIMARY CARE | Facility: CLINIC | Age: 70
End: 2025-09-24
Payer: MEDICARE